# Patient Record
Sex: MALE | Race: WHITE | NOT HISPANIC OR LATINO | Employment: OTHER | URBAN - METROPOLITAN AREA
[De-identification: names, ages, dates, MRNs, and addresses within clinical notes are randomized per-mention and may not be internally consistent; named-entity substitution may affect disease eponyms.]

---

## 2019-06-09 PROBLEM — C21.0 ANAL CANCER: Status: ACTIVE | Noted: 2019-06-09

## 2019-06-10 ENCOUNTER — TELEPHONE (OUTPATIENT)
Dept: HEMATOLOGY/ONCOLOGY | Facility: CLINIC | Age: 59
End: 2019-06-10

## 2019-06-10 NOTE — TELEPHONE ENCOUNTER
Called patient back told him he did not need any labs for this visit     ----- Message from Luz Rodriguez RN sent at 6/10/2019  1:43 PM CDT -----  Regarding: Labs  He called to ask whether he needs to get labs done today before his appointment tomorrow.  Please call him if he needs labs.

## 2019-08-20 NOTE — PROGRESS NOTES
[facility] Hematolgy/Oncology  History & Physical    Subjective:      Patient ID:   NAME: Judah العلي III : 1960     58 y.o. male    Referring Doc: Raji Alvarez  Other Physicians: Dian Trujillo; Akbar Landry; Audi Capps (PCP)        Chief Complaint: Anal SCCA       HPI:  58 y.o. male with diagnosis of SCCA of the anus who has been referred by Dr Raji Alvarez for continuation of care by medical oncology. He was initially diagnosed in 2013 and under went combined therapy with radiation and chemotherapy with mitomycin and 5FU which was completed in early . He was initially followed by Dr Dian Alvarez and then Dr Alvarez. He is here by himself. He reports that he is doing ok. He has not seen Dr Webber in awhile. He reports that his HIV has been stable. He denies any rectal bleeding. No CP, SOB, HA's or N/V. He is retired musician.               ROS:   GEN: normal without any fever, night sweats or weight loss  HEENT: normal with no HA's, sore throat, stiff neck, changes in vision  CV: normal with no CP, SOB, PND, PIPER or orthopnea  PULM: normal with no SOB, cough, hemoptysis, sputum or pleuritic pain  GI: normal with no abdominal pain, nausea, vomiting, constipation, diarrhea, melanotic stools, BRBPR, or hematemesis  : normal with no hematuria, dysuria  BREAST: normal with no mass, discharge, pain  SKIN: normal with no rash, erythema, bruising, or swelling       Past Medical/Surgical History:  Past Medical History:   Diagnosis Date    Anal cancer 2019    Anal cancer - Squamous CA - 2019    S/P chemotherapy 2019    S/P radiation therapy 2019     History reviewed. No pertinent surgical history.      Allergies:  Review of patient's allergies indicates:   Allergen Reactions    Sulfa dyne     Vicodin [hydrocodone-acetaminophen]        Social/Family History:  Social History     Socioeconomic History    Marital status: Single     Spouse name: Not  on file    Number of children: Not on file    Years of education: Not on file    Highest education level: Not on file   Occupational History    Not on file   Social Needs    Financial resource strain: Not on file    Food insecurity:     Worry: Not on file     Inability: Not on file    Transportation needs:     Medical: Not on file     Non-medical: Not on file   Tobacco Use    Smoking status: Current Every Day Smoker     Packs/day: 1.00     Types: Cigarettes    Smokeless tobacco: Never Used   Substance and Sexual Activity    Alcohol use: Not Currently    Drug use: Not Currently    Sexual activity: Not on file   Lifestyle    Physical activity:     Days per week: Not on file     Minutes per session: Not on file    Stress: Not on file   Relationships    Social connections:     Talks on phone: Not on file     Gets together: Not on file     Attends Faith service: Not on file     Active member of club or organization: Not on file     Attends meetings of clubs or organizations: Not on file     Relationship status: Not on file   Other Topics Concern    Not on file   Social History Narrative    Not on file     History reviewed. No pertinent family history.      Medications:    Current Outpatient Medications:     abacavir/dolutegravir/lamivudi (TRIUMEQ ORAL), Take by mouth., Disp: , Rfl:     clonazePAM (KLONOPIN) 0.5 MG tablet, Take 1 tablet (0.5 mg total) by mouth 3 (three) times daily as needed for Anxiety., Disp: 90 tablet, Rfl: 3    darunavir ethanolate (PREZISTA) 800 mg Tab, Take 800 mg by mouth., Disp: , Rfl:     pravastatin (PRAVACHOL) 40 MG tablet, Take 40 mg by mouth once daily., Disp: , Rfl:     ritonavir (NORVIR) 100 mg Cap, Take 100 mg by mouth 2 (two) times daily., Disp: , Rfl:       Pathology:  Cancer Staging  No matching staging information was found for the patient.      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 rate 20, height 5' 7" (1.702 m), weight 72.5 kg (159 lb 14.4 oz).    Physical Examination:   GEN: no apparent distress, comfortable; AAOx3  HEAD: atraumatic and normocephalic  EYES: no pallor, no icterus, PERRLA  ENT: OMM, no pharyngeal erythema, external ears WNL; no nasal discharge; no thrush  NECK: no masses, thyroid normal, trachea midline, no LAD/LN's, supple  CV: RRR with no murmur; normal pulse; normal S1 and S2; no pedal edema  CHEST: Normal respiratory effort; CTAB; normal breath sounds; no wheeze or crackles  ABDOM: nontender and nondistended; soft; normal bowel sounds; no rebound/guarding  MUSC/Skeletal: ROM normal; no crepitus; joints normal; no deformities or arthropathy  EXTREM: no clubbing, cyanosis, inflammation or swelling  SKIN: no rashes, lesions, ulcers, petechiae or subcutaneous nodules  : no de la rosa  NEURO: grossly intact; motor/sensory WNL; AAOx3; no tremors  PSYCH: normal mood, affect and behavior  LYMPH: normal cervical, supraclavicular, axillary and groin LN's      Labs:     None available    Radiology/Diagnostic Studies:          All lab results and imaging results have been reviewed and discussed with the patient    Assessment:   (1) 58 y.o. male with diagnosis of SCCA of the anus who has been referred by Dr Raji Alvarez for continuation of care by medical oncology.   - diagnosed by rectal biopsy on 9/25/2013  - stage II  - T2 N0  M0  - s/p concurrent chemotherapy and XRT with 5FU and mitomycin completed in early 2014  - followed by Dr Webber with Gen Surg and Dr Gerard Turjillo with rad/onc    (2) HIV positive - followed by Dr Rosalie Landry with ID    (3) Former smoker      VISIT DIAGNOSES:              Anal cancer - Squamous CA - Sept 2013    S/P radiation therapy    S/P chemotherapy            Plan: "     PLAN:  1. Schedule PET scan  2. Refer to Dr Webber for scope  3. Check up to date labs  4. F/u with PCP, ID, etc  RTC in  4 weeks   Fax note to Frantz Capps [place], [place], [place]        I have explained and the patient understands all of  the current recommendation(s). I have answered all of their questions to the best of my ability and to their complete satisfaction.             Thank you for allowing me to participate in this patient's care. Please call with any questions or concerns.    Electronically signed Luis Cruz MD

## 2019-08-21 ENCOUNTER — TELEPHONE (OUTPATIENT)
Dept: HEMATOLOGY/ONCOLOGY | Facility: CLINIC | Age: 59
End: 2019-08-21

## 2019-08-21 ENCOUNTER — LAB VISIT (OUTPATIENT)
Dept: LAB | Facility: HOSPITAL | Age: 59
End: 2019-08-21
Attending: INTERNAL MEDICINE
Payer: MEDICAID

## 2019-08-21 ENCOUNTER — OFFICE VISIT (OUTPATIENT)
Dept: HEMATOLOGY/ONCOLOGY | Facility: CLINIC | Age: 59
End: 2019-08-21
Payer: MEDICAID

## 2019-08-21 VITALS
HEIGHT: 67 IN | WEIGHT: 159.88 LBS | SYSTOLIC BLOOD PRESSURE: 128 MMHG | DIASTOLIC BLOOD PRESSURE: 86 MMHG | TEMPERATURE: 99 F | RESPIRATION RATE: 20 BRPM | HEART RATE: 87 BPM | BODY MASS INDEX: 25.09 KG/M2

## 2019-08-21 DIAGNOSIS — C21.0 ANAL CANCER: Primary | ICD-10-CM

## 2019-08-21 DIAGNOSIS — Z92.21 S/P CHEMOTHERAPY, TIME SINCE GREATER THAN 12 WEEKS: ICD-10-CM

## 2019-08-21 DIAGNOSIS — Z92.3 S/P RADIATION THERAPY: ICD-10-CM

## 2019-08-21 DIAGNOSIS — C21.0 ANAL CANCER: ICD-10-CM

## 2019-08-21 LAB
ALBUMIN SERPL BCP-MCNC: 4.1 G/DL (ref 3.5–5.2)
ALP SERPL-CCNC: 45 U/L (ref 55–135)
ALT SERPL W/O P-5'-P-CCNC: 27 U/L (ref 10–44)
ANION GAP SERPL CALC-SCNC: 6 MMOL/L (ref 8–16)
AST SERPL-CCNC: 26 U/L (ref 10–40)
BASOPHILS # BLD AUTO: 0.03 K/UL (ref 0–0.2)
BASOPHILS NFR BLD: 0.3 % (ref 0–1.9)
BILIRUB SERPL-MCNC: 0.4 MG/DL (ref 0.1–1)
BUN SERPL-MCNC: 12 MG/DL (ref 6–20)
CALCIUM SERPL-MCNC: 9.3 MG/DL (ref 8.7–10.5)
CEA SERPL-MCNC: 3.6 NG/ML (ref 0–5)
CHLORIDE SERPL-SCNC: 107 MMOL/L (ref 95–110)
CO2 SERPL-SCNC: 23 MMOL/L (ref 23–29)
CREAT SERPL-MCNC: 0.9 MG/DL (ref 0.5–1.4)
DIFFERENTIAL METHOD: ABNORMAL
EOSINOPHIL # BLD AUTO: 0.1 K/UL (ref 0–0.5)
EOSINOPHIL NFR BLD: 1.2 % (ref 0–8)
ERYTHROCYTE [DISTWIDTH] IN BLOOD BY AUTOMATED COUNT: 13.6 % (ref 11.5–14.5)
EST. GFR  (AFRICAN AMERICAN): >60 ML/MIN/1.73 M^2
EST. GFR  (NON AFRICAN AMERICAN): >60 ML/MIN/1.73 M^2
GLUCOSE SERPL-MCNC: 95 MG/DL (ref 70–110)
HCT VFR BLD AUTO: 42.7 % (ref 40–54)
HGB BLD-MCNC: 14.2 G/DL (ref 14–18)
IMM GRANULOCYTES # BLD AUTO: 0.02 K/UL (ref 0–0.04)
IMM GRANULOCYTES NFR BLD AUTO: 0.2 % (ref 0–0.5)
LYMPHOCYTES # BLD AUTO: 4.7 K/UL (ref 1–4.8)
LYMPHOCYTES NFR BLD: 49.7 % (ref 18–48)
MCH RBC QN AUTO: 31.8 PG (ref 27–31)
MCHC RBC AUTO-ENTMCNC: 33.3 G/DL (ref 32–36)
MCV RBC AUTO: 96 FL (ref 82–98)
MONOCYTES # BLD AUTO: 0.8 K/UL (ref 0.3–1)
MONOCYTES NFR BLD: 8.2 % (ref 4–15)
NEUTROPHILS # BLD AUTO: 3.8 K/UL (ref 1.8–7.7)
NEUTROPHILS NFR BLD: 40.4 % (ref 38–73)
NRBC BLD-RTO: 0 /100 WBC
PLATELET # BLD AUTO: 230 K/UL (ref 150–350)
PMV BLD AUTO: 9.1 FL (ref 9.2–12.9)
POTASSIUM SERPL-SCNC: 4.4 MMOL/L (ref 3.5–5.1)
PROT SERPL-MCNC: 7.6 G/DL (ref 6–8.4)
RBC # BLD AUTO: 4.46 M/UL (ref 4.6–6.2)
SODIUM SERPL-SCNC: 136 MMOL/L (ref 136–145)
WBC # BLD AUTO: 9.36 K/UL (ref 3.9–12.7)

## 2019-08-21 PROCEDURE — 99204 OFFICE O/P NEW MOD 45 MIN: CPT | Mod: S$GLB,,, | Performed by: INTERNAL MEDICINE

## 2019-08-21 PROCEDURE — 99204 PR OFFICE/OUTPT VISIT, NEW, LEVL IV, 45-59 MIN: ICD-10-PCS | Mod: S$GLB,,, | Performed by: INTERNAL MEDICINE

## 2019-08-21 PROCEDURE — 85025 COMPLETE CBC W/AUTO DIFF WBC: CPT

## 2019-08-21 PROCEDURE — 80053 COMPREHEN METABOLIC PANEL: CPT

## 2019-08-21 PROCEDURE — 82378 CARCINOEMBRYONIC ANTIGEN: CPT

## 2019-08-21 RX ORDER — PRAVASTATIN SODIUM 40 MG/1
40 TABLET ORAL DAILY
COMMUNITY

## 2019-08-21 RX ORDER — DARUNAVIR 800 MG/1
800 TABLET, FILM COATED ORAL
COMMUNITY

## 2019-08-21 NOTE — TELEPHONE ENCOUNTER
Called patient let him know prescription is ready for      ----- Message from Gayle Phillips sent at 8/21/2019 11:48 AM CDT -----  Pt needs a refill of klonopin.    # [phone]

## 2019-08-22 ENCOUNTER — OFFICE VISIT (OUTPATIENT)
Dept: SURGERY | Facility: CLINIC | Age: 59
End: 2019-08-22
Payer: MEDICAID

## 2019-08-22 VITALS
HEIGHT: 67 IN | DIASTOLIC BLOOD PRESSURE: 80 MMHG | BODY MASS INDEX: 24.96 KG/M2 | SYSTOLIC BLOOD PRESSURE: 126 MMHG | WEIGHT: 159 LBS

## 2019-08-22 DIAGNOSIS — Z08 ENCOUNTER FOR FOLLOW-UP SURVEILLANCE OF ANAL CANCER: Primary | ICD-10-CM

## 2019-08-22 DIAGNOSIS — Z85.048 ENCOUNTER FOR FOLLOW-UP SURVEILLANCE OF ANAL CANCER: Primary | ICD-10-CM

## 2019-08-22 PROCEDURE — 99214 OFFICE O/P EST MOD 30 MIN: CPT | Mod: PBBFAC | Performed by: SURGERY

## 2019-08-22 PROCEDURE — 99999 PR PBB SHADOW E&M-EST. PATIENT-LVL IV: ICD-10-PCS | Mod: PBBFAC,,, | Performed by: SURGERY

## 2019-08-22 PROCEDURE — 99202 OFFICE O/P NEW SF 15 MIN: CPT | Mod: S$PBB,,, | Performed by: SURGERY

## 2019-08-22 PROCEDURE — 99999 PR PBB SHADOW E&M-EST. PATIENT-LVL IV: CPT | Mod: PBBFAC,,, | Performed by: SURGERY

## 2019-08-22 PROCEDURE — 99202 PR OFFICE/OUTPT VISIT, NEW, LEVL II, 15-29 MIN: ICD-10-PCS | Mod: S$PBB,,, | Performed by: SURGERY

## 2019-08-22 RX ORDER — ABACAVIR SULFATE, DOLUTEGRAVIR SODIUM, LAMIVUDINE 600; 50; 300 MG/1; MG/1; MG/1
1 TABLET, FILM COATED ORAL DAILY
Refills: 5 | COMMUNITY
Start: 2019-08-10

## 2019-08-22 RX ORDER — RITONAVIR 100 MG/1
TABLET ORAL
Refills: 0 | COMMUNITY
Start: 2019-08-11

## 2019-08-22 RX ORDER — ZOLPIDEM TARTRATE 10 MG/1
TABLET ORAL
Refills: 2 | COMMUNITY
Start: 2019-08-05

## 2019-08-22 NOTE — H&P (VIEW-ONLY)
Subjective:       Patient ID: Judah العلي III is a 58 y.o. male.    Chief Complaint: Other (Referred by  to [facility] for anoscopy)      HPI:  Patient is 58-year-old male with history of squamous cell anal cancer diagnosed September 2013.  He completed nigra protocol with remission of the anal cancer.  He has been feeling well.  He reports no rectal pain or bleeding. Last colonoscopy was about 9 years ago.  His last anoscopy was several years ago.  He has other past medical history of HIV and is continued to be compliant with his HIV medication.    Past Medical History:   Diagnosis Date    Anal cancer 6/9/2019    Anal cancer - Squamous CA - Sept 2013 6/9/2019    S/P chemotherapy 8/21/2019    S/P radiation therapy 8/21/2019     History reviewed. No pertinent surgical history.  Review of patient's allergies indicates:   Allergen Reactions    Sulfa dyne     Vicodin [hydrocodone-acetaminophen]      Medication List with Changes/Refills   Current Medications    CLONAZEPAM (KLONOPIN) 0.5 MG TABLET    Take 1 tablet (0.5 mg total) by mouth 3 (three) times daily as needed for Anxiety.    DARUNAVIR ETHANOLATE (PREZISTA) 800 MG TAB    Take 800 mg by mouth.    PRAVASTATIN (PRAVACHOL) 40 MG TABLET    Take 40 mg by mouth once daily.    RITONAVIR (NORVIR) 100 MG TAB TABLET    TK ONE T PO QD.    TRIUMEQ 600- MG TAB    Take 1 tablet by mouth once daily.    ZOLPIDEM (AMBIEN) 10 MG TAB    TK ONE T PO QHS PRF SLEEP   Discontinued Medications    ABACAVIR/DOLUTEGRAVIR/LAMIVUDI (TRIUMEQ ORAL)    Take by mouth.    RITONAVIR (NORVIR) 100 MG CAP    Take 100 mg by mouth 2 (two) times daily.     History reviewed. No pertinent family history.  Social History     Socioeconomic History    Marital status: Single     Spouse name: Not on file    Number of children: Not on file    Years of education: Not on file    Highest education level: Not on file   Occupational History    Not on file   Social Needs    Financial  resource strain: Not on file    Food insecurity:     Worry: Not on file     Inability: Not on file    Transportation needs:     Medical: Not on file     Non-medical: Not on file   Tobacco Use    Smoking status: Current Every Day Smoker     Packs/day: 1.00     Types: Cigarettes    Smokeless tobacco: Never Used   Substance and Sexual Activity    Alcohol use: Not Currently    Drug use: Not Currently    Sexual activity: Not on file   Lifestyle    Physical activity:     Days per week: Not on file     Minutes per session: Not on file    Stress: Not on file   Relationships    Social connections:     Talks on phone: Not on file     Gets together: Not on file     Attends Spiritism service: Not on file     Active member of club or organization: Not on file     Attends meetings of clubs or organizations: Not on file     Relationship status: Not on file   Other Topics Concern    Not on file   Social History Narrative    Not on file         Review of Systems   Constitutional: Negative for appetite change, chills, fever and unexpected weight change.   HENT: Negative for hearing loss, rhinorrhea, sore throat and voice change.    Eyes: Negative for photophobia and visual disturbance.   Respiratory: Negative for cough, choking and shortness of breath.    Cardiovascular: Negative for chest pain, palpitations and leg swelling.   Gastrointestinal: Negative for abdominal pain, blood in stool, constipation, diarrhea, nausea and vomiting.   Endocrine: Negative for cold intolerance, heat intolerance, polydipsia and polyuria.   Musculoskeletal: Negative for arthralgias, back pain, joint swelling and neck stiffness.   Skin: Negative for color change, pallor and rash.   Neurological: Negative for dizziness, seizures, syncope and headaches.   Hematological: Negative for adenopathy. Does not bruise/bleed easily.   Psychiatric/Behavioral: Negative for agitation, behavioral problems and confusion.       Objective:      Physical Exam    Constitutional: He is oriented to person, place, and time. He appears well-developed and well-nourished.  Non-toxic appearance. No distress.   HENT:   Head: Normocephalic and atraumatic. Head is without abrasion and without laceration.   Right Ear: External ear normal.   Left Ear: External ear normal.   Nose: Nose normal.   Mouth/Throat: Oropharynx is clear and moist.   Eyes: Pupils are equal, round, and reactive to light. EOM are normal.   Neck: Trachea normal and phonation normal. Neck supple. No tracheal deviation and normal range of motion present.   Cardiovascular: Normal rate and regular rhythm.   Pulmonary/Chest: Effort normal. No accessory muscle usage. No tachypnea. No respiratory distress.   Abdominal: Soft. Normal appearance and bowel sounds are normal. He exhibits no distension and no mass. There is no tenderness. There is no rigidity, no rebound and no guarding.   Genitourinary:   Genitourinary Comments: No external evidence of disease    Lymphadenopathy:        Right: No inguinal adenopathy present.        Left: No inguinal adenopathy present.   Neurological: He is alert and oriented to person, place, and time. Coordination and gait normal.   Skin: Skin is warm and intact.   Psychiatric: He has a normal mood and affect. His speech is normal and behavior is normal.       Assessment/Plan:   Judah was seen today for other.    Diagnoses and all orders for this visit:    Encounter for follow-up surveillance of anal cancer  -     Vital signs; Standing  -     Insert peripheral IV; Standing  -     Diet NPO; Standing  -     Pulse Oximetry Q4H; Standing  -     Case Request Operating Room: COLONOSCOPY  -     Full code; Standing  -     Place in Outpatient; Standing  -     Insert peripheral IV  -     Full code    Other orders  -     lidocaine (PF) 10 mg/ml (1%) injection 10 mg  -     0.9%  NaCl infusion  -     IP VTE LOW RISK PATIENT; Standing      History of T2N0 anal cancer s/p Avni protocol. Last colonoscopy  near 10 years ago.  Last anoscope was a few years ago. He will be scheduled for colonoscopy Sept 6th.      Planned procedure: colonoscopy     NPO past midnight    I discussed the proposed procedures to the the patient including risks, benefits, indications, alternatives and special concerns.  The patient appears to understand and agrees to go ahead with the procedure.  I have made no promises, warranties or verbal agreements beyond what was discussed above.

## 2019-08-22 NOTE — PROGRESS NOTES
Subjective:       Patient ID: Judah العلي III is a 58 y.o. male.    Chief Complaint: Other (Referred by  to [facility] for anoscopy)      HPI:  ***    Past Medical History:   Diagnosis Date    Anal cancer 6/9/2019    Anal cancer - Squamous CA - Sept 2013 6/9/2019    Anxiety     Depression     HIV (human immunodeficiency virus infection)     S/P chemotherapy 8/21/2019    S/P radiation therapy 8/21/2019     Past Surgical History:   Procedure Laterality Date    HEMORRHOID SURGERY  09/25/2013        REMOVAL OF VASCULAR ACCESS PORT  2014    TONSILLECTOMY       Review of patient's allergies indicates:   Allergen Reactions    Sulfa dyne     Vicodin [hydrocodone-acetaminophen]      Medication List with Changes/Refills   Current Medications    CLONAZEPAM (KLONOPIN) 0.5 MG TABLET    Take 1 tablet (0.5 mg total) by mouth 3 (three) times daily as needed for Anxiety.    DARUNAVIR ETHANOLATE (PREZISTA) 800 MG TAB    Take 800 mg by mouth.    PRAVASTATIN (PRAVACHOL) 40 MG TABLET    Take 40 mg by mouth once daily.    RITONAVIR (NORVIR) 100 MG TAB TABLET    TK ONE T PO QD.    TRIUMEQ 600- MG TAB    Take 1 tablet by mouth once daily.    ZOLPIDEM (AMBIEN) 10 MG TAB    TK ONE T PO QHS PRF SLEEP   Discontinued Medications    ABACAVIR/DOLUTEGRAVIR/LAMIVUDI (TRIUMEQ ORAL)    Take by mouth.    RITONAVIR (NORVIR) 100 MG CAP    Take 100 mg by mouth 2 (two) times daily.     Family History   Problem Relation Age of Onset    Lung cancer Mother      Social History     Socioeconomic History    Marital status: Single     Spouse name: Not on file    Number of children: Not on file    Years of education: Not on file    Highest education level: Not on file   Occupational History    Not on file   Social Needs    Financial resource strain: Not on file    Food insecurity:     Worry: Not on file     Inability: Not on file    Transportation needs:     Medical: Not on file     Non-medical: Not on file  "  Tobacco Use    Smoking status: Current Every Day Smoker     Packs/day: 1.00     Types: Cigarettes    Smokeless tobacco: Never Used   Substance and Sexual Activity    Alcohol use: Not Currently    Drug use: Not Currently    Sexual activity: Not on file   Lifestyle    Physical activity:     Days per week: Not on file     Minutes per session: Not on file    Stress: Not on file   Relationships    Social connections:     Talks on phone: Not on file     Gets together: Not on file     Attends Hoahaoism service: Not on file     Active member of club or organization: Not on file     Attends meetings of clubs or organizations: Not on file     Relationship status: Not on file   Other Topics Concern    Not on file   Social History Narrative    Not on file         Review of Systems   Constitutional: Negative for appetite change, chills, fever and unexpected weight change.   HENT: Negative for hearing loss, rhinorrhea, sore throat and voice change.    Eyes: Negative for photophobia and visual disturbance.   Respiratory: Negative for cough, choking and shortness of breath.    Cardiovascular: Negative for chest pain, palpitations and leg swelling.   Gastrointestinal: Negative for abdominal pain, blood in stool, constipation, diarrhea, nausea and vomiting.   Endocrine: Negative for cold intolerance, heat intolerance and polyphagia.   Genitourinary: Negative for dysuria.   Musculoskeletal: Negative for arthralgias and back pain.   Skin: Negative for color change.   Neurological: Negative for dizziness, seizures, syncope and headaches.   Hematological: Negative for adenopathy. Does not bruise/bleed easily.       Objective:      Physical Exam    Assessment/Plan:   Encounter for follow-up surveillance of anal cancer        Planned procedure: ***    {Blank single:41617::"Ancef 2 gm IV on call to OR","Mefoxin 2 gm IV on call to OR","Levaquin 500 mg IV on call to OR","Levaquin 500 mg IV AND Flagyl 500 mg IV on call to " "OR","Vanco 1 gm IV on call to OR"}    NPO past midnight    Maxwell cloth scrub per protocol    SCDs Bilateral Lower Extremities    I discussed the proposed procedures the the patient including risks, benefits, indications, alternatives and special concerns.  The patient appears to understand and agrees to go ahead with surgery.  I have made no promises, warranties or verbal agreements beyond what was discussed above.    No follow-ups on file.  "

## 2019-08-22 NOTE — PROGRESS NOTES
Subjective:       Patient ID: Judah العلي III is a 58 y.o. male.    Chief Complaint: Other (Referred by  to [facility] for anoscopy)      HPI:  Patient is 58-year-old male with history of squamous cell anal cancer diagnosed September 2013.  He completed nigra protocol with remission of the anal cancer.  He has been feeling well.  He reports no rectal pain or bleeding. Last colonoscopy was about 9 years ago.  His last anoscopy was several years ago.  He has other past medical history of HIV and is continued to be compliant with his HIV medication.    Past Medical History:   Diagnosis Date    Anal cancer 6/9/2019    Anal cancer - Squamous CA - Sept 2013 6/9/2019    S/P chemotherapy 8/21/2019    S/P radiation therapy 8/21/2019     History reviewed. No pertinent surgical history.  Review of patient's allergies indicates:   Allergen Reactions    Sulfa dyne     Vicodin [hydrocodone-acetaminophen]      Medication List with Changes/Refills   Current Medications    CLONAZEPAM (KLONOPIN) 0.5 MG TABLET    Take 1 tablet (0.5 mg total) by mouth 3 (three) times daily as needed for Anxiety.    DARUNAVIR ETHANOLATE (PREZISTA) 800 MG TAB    Take 800 mg by mouth.    PRAVASTATIN (PRAVACHOL) 40 MG TABLET    Take 40 mg by mouth once daily.    RITONAVIR (NORVIR) 100 MG TAB TABLET    TK ONE T PO QD.    TRIUMEQ 600- MG TAB    Take 1 tablet by mouth once daily.    ZOLPIDEM (AMBIEN) 10 MG TAB    TK ONE T PO QHS PRF SLEEP   Discontinued Medications    ABACAVIR/DOLUTEGRAVIR/LAMIVUDI (TRIUMEQ ORAL)    Take by mouth.    RITONAVIR (NORVIR) 100 MG CAP    Take 100 mg by mouth 2 (two) times daily.     History reviewed. No pertinent family history.  Social History     Socioeconomic History    Marital status: Single     Spouse name: Not on file    Number of children: Not on file    Years of education: Not on file    Highest education level: Not on file   Occupational History    Not on file   Social Needs    Financial  resource strain: Not on file    Food insecurity:     Worry: Not on file     Inability: Not on file    Transportation needs:     Medical: Not on file     Non-medical: Not on file   Tobacco Use    Smoking status: Current Every Day Smoker     Packs/day: 1.00     Types: Cigarettes    Smokeless tobacco: Never Used   Substance and Sexual Activity    Alcohol use: Not Currently    Drug use: Not Currently    Sexual activity: Not on file   Lifestyle    Physical activity:     Days per week: Not on file     Minutes per session: Not on file    Stress: Not on file   Relationships    Social connections:     Talks on phone: Not on file     Gets together: Not on file     Attends Jehovah's witness service: Not on file     Active member of club or organization: Not on file     Attends meetings of clubs or organizations: Not on file     Relationship status: Not on file   Other Topics Concern    Not on file   Social History Narrative    Not on file         Review of Systems   Constitutional: Negative for appetite change, chills, fever and unexpected weight change.   HENT: Negative for hearing loss, rhinorrhea, sore throat and voice change.    Eyes: Negative for photophobia and visual disturbance.   Respiratory: Negative for cough, choking and shortness of breath.    Cardiovascular: Negative for chest pain, palpitations and leg swelling.   Gastrointestinal: Negative for abdominal pain, blood in stool, constipation, diarrhea, nausea and vomiting.   Endocrine: Negative for cold intolerance, heat intolerance, polydipsia and polyuria.   Musculoskeletal: Negative for arthralgias, back pain, joint swelling and neck stiffness.   Skin: Negative for color change, pallor and rash.   Neurological: Negative for dizziness, seizures, syncope and headaches.   Hematological: Negative for adenopathy. Does not bruise/bleed easily.   Psychiatric/Behavioral: Negative for agitation, behavioral problems and confusion.       Objective:      Physical Exam    Constitutional: He is oriented to person, place, and time. He appears well-developed and well-nourished.  Non-toxic appearance. No distress.   HENT:   Head: Normocephalic and atraumatic. Head is without abrasion and without laceration.   Right Ear: External ear normal.   Left Ear: External ear normal.   Nose: Nose normal.   Mouth/Throat: Oropharynx is clear and moist.   Eyes: Pupils are equal, round, and reactive to light. EOM are normal.   Neck: Trachea normal and phonation normal. Neck supple. No tracheal deviation and normal range of motion present.   Cardiovascular: Normal rate and regular rhythm.   Pulmonary/Chest: Effort normal. No accessory muscle usage. No tachypnea. No respiratory distress.   Abdominal: Soft. Normal appearance and bowel sounds are normal. He exhibits no distension and no mass. There is no tenderness. There is no rigidity, no rebound and no guarding.   Genitourinary:   Genitourinary Comments: No external evidence of disease    Lymphadenopathy:        Right: No inguinal adenopathy present.        Left: No inguinal adenopathy present.   Neurological: He is alert and oriented to person, place, and time. Coordination and gait normal.   Skin: Skin is warm and intact.   Psychiatric: He has a normal mood and affect. His speech is normal and behavior is normal.       Assessment/Plan:   Judah was seen today for other.    Diagnoses and all orders for this visit:    Encounter for follow-up surveillance of anal cancer  -     Vital signs; Standing  -     Insert peripheral IV; Standing  -     Diet NPO; Standing  -     Pulse Oximetry Q4H; Standing  -     Case Request Operating Room: COLONOSCOPY  -     Full code; Standing  -     Place in Outpatient; Standing  -     Insert peripheral IV  -     Full code    Other orders  -     lidocaine (PF) 10 mg/ml (1%) injection 10 mg  -     0.9%  NaCl infusion  -     IP VTE LOW RISK PATIENT; Standing      History of T2N0 anal cancer s/p Avni protocol. Last colonoscopy  near 10 years ago.  Last anoscope was a few years ago. He will be scheduled for colonoscopy Sept 6th.      Planned procedure: colonoscopy     NPO past midnight    I discussed the proposed procedures to the the patient including risks, benefits, indications, alternatives and special concerns.  The patient appears to understand and agrees to go ahead with the procedure.  I have made no promises, warranties or verbal agreements beyond what was discussed above.

## 2019-08-22 NOTE — LETTER
August 23, 2019      Luis Cruz MD  [address] [facility]  [address]  [place] [address]           [facility]  [address]  [place] [address]  Phone: [phone]  Fax: [phone]          Patient: Judah العلي III   MR Number: [id number]   YOB: 1960   Date of Visit: 8/22/2019       Dear Dr. Luis Cruz:    Thank you for referring Judah العلي III to me for evaluation. Attached you will find relevant portions of my assessment and plan of care.    If you have questions, please do not hesitate to call me. I look forward to following Judah العلي III along with you.    Sincerely,    Enzo Webber III, MD    Enclosure  CC:  No Recipients    If you would like to receive this communication electronically, please contact [email or web address] or [phone] to request more information on [employer] access.    For providers and/or their staff who would like to refer a patient to [facility], please contact us through our one-stop-shop provider referral line, [facility], at [phone].    If you feel you have received this communication in error or would no longer like to receive these types of communications, please e-mail [email or web address]

## 2019-08-23 RX ORDER — SODIUM CHLORIDE 9 MG/ML
INJECTION, SOLUTION INTRAVENOUS CONTINUOUS
Status: CANCELLED | OUTPATIENT
Start: 2019-08-23

## 2019-08-23 RX ORDER — LIDOCAINE HYDROCHLORIDE 10 MG/ML
1 INJECTION, SOLUTION EPIDURAL; INFILTRATION; INTRACAUDAL; PERINEURAL ONCE
Status: DISCONTINUED | OUTPATIENT
Start: 2019-08-23 | End: 2019-09-16 | Stop reason: HOSPADM

## 2019-08-30 ENCOUNTER — TELEPHONE (OUTPATIENT)
Dept: HEMATOLOGY/ONCOLOGY | Facility: CLINIC | Age: 59
End: 2019-08-30

## 2019-08-30 NOTE — TELEPHONE ENCOUNTER
----- Message from Tarun Calle sent at 8/30/2019  8:56 AM CDT -----  PER REFERRAL DENIED PER PT INS NURSE ADVISED AND WILL CONTACT PATIENT ABOUT NEXT STEPS TO PROCESS REFERRAL       Sent to Lesley

## 2019-09-12 ENCOUNTER — TELEPHONE (OUTPATIENT)
Dept: HEMATOLOGY/ONCOLOGY | Facility: CLINIC | Age: 59
End: 2019-09-12

## 2019-09-12 DIAGNOSIS — D49.0 COLORECTAL NEOPLASM: ICD-10-CM

## 2019-09-16 ENCOUNTER — HOSPITAL ENCOUNTER (OUTPATIENT)
Facility: HOSPITAL | Age: 59
Discharge: HOME | End: 2019-09-16
Attending: SURGERY | Admitting: SURGERY
Payer: MEDICAID

## 2019-09-16 VITALS
OXYGEN SATURATION: 97 % | RESPIRATION RATE: 15 BRPM | DIASTOLIC BLOOD PRESSURE: 79 MMHG | TEMPERATURE: 98 F | HEART RATE: 90 BPM | WEIGHT: 159 LBS | SYSTOLIC BLOOD PRESSURE: 122 MMHG | HEIGHT: 66 IN | BODY MASS INDEX: 25.55 KG/M2

## 2019-09-16 DIAGNOSIS — Z85.048 ENCOUNTER FOR FOLLOW-UP SURVEILLANCE OF ANAL CANCER: Primary | ICD-10-CM

## 2019-09-16 DIAGNOSIS — Z12.11 COLON CANCER SCREENING: ICD-10-CM

## 2019-09-16 DIAGNOSIS — Z08 ENCOUNTER FOR FOLLOW-UP SURVEILLANCE OF ANAL CANCER: Primary | ICD-10-CM

## 2019-09-16 PROCEDURE — 45378 DIAGNOSTIC COLONOSCOPY: CPT | Performed by: SURGERY

## 2019-09-16 PROCEDURE — 99152 MOD SED SAME PHYS/QHP 5/>YRS: CPT | Mod: 59 | Performed by: SURGERY

## 2019-09-16 PROCEDURE — 45378 DIAGNOSTIC COLONOSCOPY: CPT | Mod: ,,, | Performed by: SURGERY

## 2019-09-16 PROCEDURE — 45378 PR COLONOSCOPY,DIAGNOSTIC: ICD-10-PCS | Mod: ,,, | Performed by: SURGERY

## 2019-09-16 PROCEDURE — 46600 DIAGNOSTIC ANOSCOPY SPX: CPT | Performed by: SURGERY

## 2019-09-16 PROCEDURE — 99153 MOD SED SAME PHYS/QHP EA: CPT | Performed by: SURGERY

## 2019-09-16 PROCEDURE — 63600175 PHARM REV CODE 636 W HCPCS: Performed by: SURGERY

## 2019-09-16 PROCEDURE — 27000681: Performed by: SURGERY

## 2019-09-16 RX ORDER — DIAZEPAM 10 MG/2ML
INJECTION INTRAMUSCULAR
Status: COMPLETED | OUTPATIENT
Start: 2019-09-16 | End: 2019-09-16

## 2019-09-16 RX ORDER — SODIUM CHLORIDE 9 MG/ML
INJECTION, SOLUTION INTRAVENOUS CONTINUOUS PRN
Status: COMPLETED | OUTPATIENT
Start: 2019-09-16 | End: 2019-09-16

## 2019-09-16 RX ORDER — SODIUM CHLORIDE 9 MG/ML
INJECTION, SOLUTION INTRAVENOUS CONTINUOUS
Status: DISCONTINUED | OUTPATIENT
Start: 2019-09-16 | End: 2019-09-16 | Stop reason: HOSPADM

## 2019-09-16 RX ORDER — SODIUM CHLORIDE 9 MG/ML
INJECTION, SOLUTION INTRAVENOUS CONTINUOUS
Status: CANCELLED | OUTPATIENT
Start: 2019-09-16

## 2019-09-16 RX ORDER — MEPERIDINE HYDROCHLORIDE 100 MG/ML
INJECTION INTRAMUSCULAR; INTRAVENOUS; SUBCUTANEOUS
Status: COMPLETED | OUTPATIENT
Start: 2019-09-16 | End: 2019-09-16

## 2019-09-16 RX ORDER — SODIUM CHLORIDE 0.9 % (FLUSH) 0.9 %
2 SYRINGE (ML) INJECTION
Status: DISCONTINUED | OUTPATIENT
Start: 2019-09-16 | End: 2019-09-16 | Stop reason: HOSPADM

## 2019-09-16 RX ORDER — MIDAZOLAM HYDROCHLORIDE 5 MG/ML
INJECTION INTRAMUSCULAR; INTRAVENOUS
Status: COMPLETED | OUTPATIENT
Start: 2019-09-16 | End: 2019-09-16

## 2019-09-16 RX ADMIN — MIDAZOLAM HYDROCHLORIDE 1 MG: 5 INJECTION, SOLUTION INTRAMUSCULAR; INTRAVENOUS at 11:09

## 2019-09-16 RX ADMIN — DIAZEPAM 5 MG: 5 INJECTION, SOLUTION INTRAMUSCULAR; INTRAVENOUS at 10:09

## 2019-09-16 RX ADMIN — Medication 25 MG: at 11:09

## 2019-09-16 RX ADMIN — Medication 50 MG: at 11:09

## 2019-09-16 RX ADMIN — MIDAZOLAM HYDROCHLORIDE 1 MG: 5 INJECTION, SOLUTION INTRAMUSCULAR; INTRAVENOUS at 10:09

## 2019-09-16 RX ADMIN — SODIUM CHLORIDE 100 ML/HR: 0.9 INJECTION, SOLUTION INTRAVENOUS at 10:09

## 2019-09-16 RX ADMIN — Medication 50 MG: at 10:09

## 2019-09-16 RX ADMIN — MIDAZOLAM HYDROCHLORIDE 2 MG: 5 INJECTION, SOLUTION INTRAMUSCULAR; INTRAVENOUS at 10:09

## 2019-09-16 RX ADMIN — Medication 25 MG: at 10:09

## 2019-09-16 NOTE — DISCHARGE INSTRUCTIONS
Colonoscopy     A camera attached to a flexible tube with a viewing lens is used to take video pictures.     Colonoscopy is a test to view the inside of your lower digestive tract (colon and rectum). Sometimes it can show the last part of the small intestine (ileum). During the test, small pieces of tissue may be removed for testing. This is called a biopsy. Small growths, such as polyps, may also be removed.   Why is colonoscopy done?  The test is done to help look for colon cancer. And it can help find the source of abdominal pain, bleeding, and changes in bowel habits. It may be needed once a year, depending on factors such as your:  · Age  · Health history  · Family health history  · Symptoms  · Results from any prior colonoscopy  Risks and possible complications  These include:  · Bleeding               · A puncture or tear in the colon   · Risks of anesthesia  · A cancer lesion not being seen  Getting ready   To prepare for the test:  · Talk with your healthcare provider about the risks of the test (see below). Also ask your healthcare provider about alternatives to the test.  · Tell your healthcare provider about any medicines you take. Also tell him or her about any health conditions you may have.  · Make sure your rectum and colon are empty for the test. Follow the diet and bowel prep instructions exactly. If you dont, the test may need to be rescheduled.  · Plan for a friend or family member to drive you home after the test.     Colonoscopy provides an inside view of the entire colon.     You may discuss the results with your doctor right away or at a future visit.  During the test   The test is usually done in the hospital on an outpatient basis. This means you go home the same day. The procedure takes about 30 minutes. During that time:  · You are given relaxing (sedating) medicine through an IV line. You may be drowsy, or fully asleep.  · The healthcare provider will first give you a physical exam to  check for anal and rectal problems.  · Then the anus is lubricated and the scope inserted.  · If you are awake, you may have a feeling similar to needing to have a bowel movement. You may also feel pressure as air is pumped into the colon. Its OK to pass gas during the procedure.  · Biopsy, polyp removal, or other treatments may be done during the test.  After the test   You may have gas right after the test. It can help to try to pass it to help prevent later bloating. Your healthcare provider may discuss the results with you right away. Or you may need to schedule a follow-up visit to talk about the results. After the test, you can go back to your normal eating and other activities. You may be tired from the sedation and need to rest for a few hours.  Date Last Reviewed: 11/1/2016 © 2000-2017 [employer]. [address] [place] [address]. All rights reserved. This information is not intended as a substitute for professional medical care. Always follow your healthcare professional's instruc  For the next 24 Hours No driving, making important decisions, drinking alcohol  If you should have  2 table spoons of Bright red blood from rectum report to the nearest emergency room.  Avoid gas forming food for today

## 2019-09-16 NOTE — INTERVAL H&P NOTE
The patient has been examined and the H&P has been reviewed:    I concur with the findings and no changes have occurred since H&P was written.    Anesthesia/Surgery risks, benefits and alternative options discussed and understood by patient/family.          Active Hospital Problems    Diagnosis  POA    Encounter for follow-up surveillance of anal cancer [Z08, Z85.048]  Not Applicable    Colon cancer screening [Z12.11]  Not Applicable      Resolved Hospital Problems   No resolved problems to display.

## 2019-09-16 NOTE — BRIEF OP NOTE
Critical access hospital  Brief Operative Note    SUMMARY     Surgery Date: 9/16/2019     Surgeon(s) and Role:     * Enzo Webber III, MD - Primary    Assisting Surgeon: None    Pre-op Diagnosis:  Encounter for follow-up surveillance of anal cancer [Z08, Z85.048]  Colon cancer screening     Post-op Diagnosis:  Post-Op Diagnosis Codes: same     * Encounter for follow-up surveillance of anal cancer [Z08, Z85.048]    Procedure(s) (LRB):  COLONOSCOPY (N/A) and anoscopy    Anesthesia: RN IV Sedation - 175mg demerol, 5mg versed, 10mg valium     Description of Procedure:     Procedure time 3782-2915    Findings - No polyps. No evidence of recurrent anal disease       Patient was brought to the endoscopy suite and put on his left side. He was given sedation. Digital rectal exam was performed without any abnormalities appreciated. Anoscopy was performed and no anal lesion or ulceration was appreciated. The endoscope was then introduced into the anorectum and advanced all the way to the cecum. The scope was then slowly withdrawn while insufflating. No polyps or other lesions appreciated in any segment of the colon. The scope was retroflexed while in the rectum and no rectal or distal rectal lesions were appreciated. He did have small internal hemorrhoids. Scope was withdrawn completely. He tolerated the procedure well. The prep was good.     Estimated Blood Loss: 0mL    Complications none         Specimens:   Specimen (12h ago, onward)    None

## 2019-09-16 NOTE — DISCHARGE SUMMARY
Discharge Summary  General Surgery      Admit Date: 9/16/2019    Discharge Date :9/16/2019    Attending Physician: Enzo Webber III     Discharge Physician: Enzo Webber III    Discharged Condition: good    Discharge Diagnosis: Encounter for follow-up surveillance of anal cancer [Z08, Z85.048]    Treatments/Procedures: Procedure(s) (LRB):  COLONOSCOPY (N/A)    Hospital Course: Uneventful; Discharged home from [facility]    Significant Diagnostic Studies: none    Disposition: Home or Self Care    Diet: Regular    Follow up: 6 months    Activity: No heavy lifting till seen in office.    Patient Instructions:   Discharge Medication List as of 9/16/2019 11:40 AM      CONTINUE these medications which have NOT CHANGED    Details   clonazePAM (KLONOPIN) 0.5 MG tablet Take 1 tablet (0.5 mg total) by mouth 3 (three) times daily as needed for Anxiety., Starting Fri 3/15/2019, Until Sat 3/14/2020, Print      darunavir ethanolate (PREZISTA) 800 mg Tab Take 800 mg by mouth., Historical Med      pravastatin (PRAVACHOL) 40 MG tablet Take 40 mg by mouth once daily., Historical Med      ritonavir (NORVIR) 100 mg Tab tablet TK ONE T PO QD., Historical Med      TRIUMEQ 600- mg Tab Take 1 tablet by mouth once daily., Starting Sat 8/10/2019, Historical Med      zolpidem (AMBIEN) 10 mg Tab TK ONE T PO QHS PRF SLEEP, Historical Med             Discharge Procedure Orders   Diet Adult Regular     Notify your health care provider if you experience any of the following:  temperature >100.4     Notify your health care provider if you experience any of the following:  persistent nausea and vomiting or diarrhea     Notify your health care provider if you experience any of the following:  redness, tenderness, or signs of infection (pain, swelling, redness, odor or green/yellow discharge around incision site)     Notify your health care provider if you experience any of the following:  increased confusion or weakness     Remove  dressing in 48 hours

## 2019-09-17 NOTE — OP NOTE
Surgery Date: 9/16/2019     Surgeon(s) and Role:     * Enzo Webber III, MD - Primary    Assisting Surgeon: None    Pre-op Diagnosis:  Encounter for follow-up surveillance of anal cancer [Z08, Z85.048]  Colon cancer screening     Post-op Diagnosis:  Post-Op Diagnosis Codes: same     * Encounter for follow-up surveillance of anal cancer [Z08, Z85.048]    Procedure(s) (LRB):  COLONOSCOPY (N/A) and anoscopy    Anesthesia: RN IV Sedation - 175mg demerol, 5mg versed, 10mg valium     Description of Procedure:     Procedure time [id number]    Findings - No polyps. No evidence of recurrent anal disease       Patient was brought to the endoscopy suite and put on his left side. He was given sedation. Digital rectal exam was performed without any abnormalities appreciated. Anoscopy was performed and no anal lesion or ulceration was appreciated. The endoscope was then introduced into the anorectum and advanced all the way to the cecum. The scope was then slowly withdrawn while insufflating. No polyps or other lesions appreciated in any segment of the colon. The scope was retroflexed while in the rectum and no rectal or distal rectal lesions were appreciated. He did have small internal hemorrhoids. Scope was withdrawn completely. He tolerated the procedure well. The prep was good.     Estimated Blood Loss: 0mL    Complications none         Specimens:   Specimen (12h ago, onward)    None

## 2019-09-19 ENCOUNTER — HOSPITAL ENCOUNTER (OUTPATIENT)
Dept: RADIOLOGY | Facility: HOSPITAL | Age: 59
Discharge: HOME | End: 2019-09-19
Attending: INTERNAL MEDICINE
Payer: MEDICAID

## 2019-09-19 DIAGNOSIS — D49.0 COLORECTAL NEOPLASM: ICD-10-CM

## 2019-09-19 PROCEDURE — 25500020 PHARM REV CODE 255: Mod: PO | Performed by: INTERNAL MEDICINE

## 2019-09-19 PROCEDURE — 74177 CT ABD & PELVIS W/CONTRAST: CPT | Mod: TC,PO

## 2019-09-19 RX ADMIN — IOHEXOL 100 ML: 350 INJECTION, SOLUTION INTRAVENOUS at 02:09

## 2019-09-23 NOTE — PROGRESS NOTES
[facility] Hematology/Oncology  PROGRESS NOTE - 2nd Follow-up Visit      Subjective:       Patient ID:   NAME: Judah العلي III : 1960     58 y.o. male    Referring Doc: Raji Alvarez  Other Physicians: Dian Trujillo; Akbar Landry; uAdi Capps (PCP)    Chief Complaint:  Anal SCCA f/u    History of Present Illness:     Patient returns today for a 2nd regularly scheduled follow-up visit.  The patient is here today to go over the results of the recently ordered labs, tests and studies. He is here by himself. He is doing ok with no new issues. Breathing ok, bowels ok. He denies any CP, SOB, Ha's or N/V. He saw Dr Webber and had repeat scope on 2019. He had some f/u CT scans on 2019.             ROS:   GEN: normal without any fever, night sweats or weight loss  HEENT: normal with no HA's, sore throat, stiff neck, changes in vision  CV: normal with no CP, SOB, PND, PIPER or orthopnea  PULM: normal with no SOB, cough, hemoptysis, sputum or pleuritic pain  GI: normal with no abdominal pain, nausea, vomiting, constipation, diarrhea, melanotic stools, BRBPR, or hematemesis  : normal with no hematuria, dysuria  BREAST: normal with no mass, discharge, pain  SKIN: normal with no rash, erythema, bruising, or swelling    Allergies:  Review of patient's allergies indicates:   Allergen Reactions    Sulfa dyne     Vicodin [hydrocodone-acetaminophen]        Medications:    Current Outpatient Medications:     clonazePAM (KLONOPIN) 0.5 MG tablet, Take 1 tablet (0.5 mg total) by mouth 3 (three) times daily as needed for Anxiety., Disp: 90 tablet, Rfl: 3    darunavir ethanolate (PREZISTA) 800 mg Tab, Take 800 mg by mouth., Disp: , Rfl:     ergocalciferol (ERGOCALCIFEROL) 50,000 unit Cap, TK 1 C PO Q WK FOR 56 DAYS, Disp: , Rfl: 2    pravastatin (PRAVACHOL) 40 MG tablet, Take 40 mg by mouth once daily., Disp: , Rfl:     ritonavir (NORVIR) 100 mg Tab tablet, TK ONE T PO QD., Disp: , Rfl: 0     TRIUMEQ 600- mg Tab, Take 1 tablet by mouth once daily., Disp: , Rfl: 5    zolpidem (AMBIEN) 10 mg Tab, TK ONE T PO QHS PRF SLEEP, Disp: , Rfl: 2    PMHx/PSHx Updates:  See patient's last visit with me on 8/21/2019.  See H&P on         Pathology:  Cancer Staging  No matching staging information was found for the patient.      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 rate 18, weight 71.7 kg (158 lb).    Physical Examination:   GEN: no apparent distress, comfortable; AAOx3  HEAD: atraumatic and normocephalic  EYES: no pallor, no icterus, PERRLA  ENT: OMM, no pharyngeal erythema, external ears WNL; no nasal discharge; no thrush  NECK: no masses, thyroid normal, trachea midline, no LAD/LN's, supple  CV: RRR with no murmur; normal pulse; normal S1 and S2; no pedal edema  CHEST: Normal respiratory effort; CTAB; normal breath sounds; no wheeze or crackles  ABDOM: nontender and nondistended; soft; normal bowel sounds; no rebound/guarding  MUSC/Skeletal: ROM normal; no crepitus; joints normal; no deformities or arthropathy  EXTREM: no clubbing, cyanosis, inflammation or swelling  SKIN: no rashes, lesions, ulcers, petechiae or subcutaneous nodules  : no de la rosa  NEURO: grossly intact; motor/sensory WNL; AAOx3; no tremors  PSYCH: normal mood, affect and behavior  LYMPH: normal cervical, supraclavicular, axillary and groin LN's            Labs:   8/21/2019  Lab Results   Component Value Date    WBC 9.36 08/21/2019    HGB 14.2 08/21/2019    HCT 42.7 08/21/2019    MCV 96 08/21/2019     08/21/2019     CMP  Sodium   Date Value Ref Range Status   08/21/2019 136 136 - 145 mmol/L Final     Potassium   Date Value Ref Range Status   08/21/2019 4.4 3.5 - 5.1 mmol/L Final     Chloride   Date Value Ref Range  Status   08/21/2019 107 95 - 110 mmol/L Final     CO2   Date Value Ref Range Status   08/21/2019 23 23 - 29 mmol/L Final     Glucose   Date Value Ref Range Status   08/21/2019 95 70 - 110 mg/dL Final     BUN, Bld   Date Value Ref Range Status   08/21/2019 12 6 - 20 mg/dL Final     Creatinine   Date Value Ref Range Status   08/21/2019 0.9 0.5 - 1.4 mg/dL Final     Calcium   Date Value Ref Range Status   08/21/2019 9.3 8.7 - 10.5 mg/dL Final     Total Protein   Date Value Ref Range Status   08/21/2019 7.6 6.0 - 8.4 g/dL Final     Albumin   Date Value Ref Range Status   08/21/2019 4.1 3.5 - 5.2 g/dL Final     Total Bilirubin   Date Value Ref Range Status   08/21/2019 0.4 0.1 - 1.0 mg/dL Final     Comment:     For infants and newborns, interpretation of results should be based  on gestational age, weight and in agreement with clinical  observations.  Premature Infant recommended reference ranges:  Up to 24 hours.............<8.0 mg/dL  Up to 48 hours............<12.0 mg/dL  3-5 days..................<15.0 mg/dL  6-29 days.................<15.0 mg/dL       Alkaline Phosphatase   Date Value Ref Range Status   08/21/2019 45 (L) 55 - 135 U/L Final     AST   Date Value Ref Range Status   08/21/2019 26 10 - 40 U/L Final     ALT   Date Value Ref Range Status   08/21/2019 27 10 - 44 U/L Final     Anion Gap   Date Value Ref Range Status   08/21/2019 6 (L) 8 - 16 mmol/L Final     eGFR if    Date Value Ref Range Status   08/21/2019 >60.0 >60 mL/min/1.73 m^2 Final     eGFR if non    Date Value Ref Range Status   08/21/2019 >60.0 >60 mL/min/1.73 m^2 Final     Comment:     Calculation used to obtain the estimated glomerular filtration  rate (eGFR) is the CKD-EPI equation.        Lab Results   Component Value Date    CEA 3.6 08/21/2019             Radiology/Diagnostic Studies:    Ct Chest Abdomen Pelvis With Contrast    Result Date: 9/19/2019      CMS MANDATED QUALITY DATA - CT RADIATION - 436 All CT  scans at this facility utilize dose modulation, iterative reconstruction, and/or weight based dosing when appropriate to reduce radiation dose to as low as reasonably achievable. CLINICAL HISTORY: ([id number])57 y/o  (1960) M Neoplasm: colorectal, rx monitor or f/u; Neoplasm of unspecified behavior of digestive system TECHNIQUE: (A#[id number], exam time 9/19/2019 15:10) CT CHEST ABDOMEN PELVIS WITH CONTRAST (XPD) [id number] Axial CT images of the chest, abdomen and pelvis were obtained from the thoracic inlet to the proximal thigh. COMPARISON: CT most recently from 12/11/2014. FINDINGS: CT Chest: Visualized neck: normal Lungs: Mild apical paraseptal emphysematous lung disease. Airway: The trachea and central bronchial tree appear normal. Pleura: There is no pleural effusion. There is no pneumothorax. Cardiovascular: The heart is normal in size. There is no pericardial effusion. The thoracic aorta and great vessels are normal in course and caliber. Mediastinum: There is no supraclavicular  axillary  mediastinal  or hilar lymphadenopathy. Soft tissues: There is moderate prominence of the breast tissue  possibly representing gynecomastia. Musculoskeletal: There are mild degenerative changes of the thoracic spine.  There are no suspicious osseous lesions. Esophagus: normal CT Abdomen: Liver: Relative diffuse low-attenuation liver consistent with hepatic steatosis; no intrahepatic mass is identified. Gallbladder: There is a small pharyngeal cap.  There are stones seen in the gallbladder without wall thickening or pericholecystic fluid to suggest acute cholecystitis. Biliary Tree: No intra or extrahepatic ductal dilation. Spleen: Normal. Pancreas: Within normal limits. Adrenal Glands: Normal Kidneys: Within normal limits. Vasculature: Scattered atheromatous calcifications are seen in the abdominal aorta and iliacs with mild ectasia of the infrarenal abdominal aorta measuring up to 2.5 cm in diameter. Lymph nodes: No  abdominal lymphadenopathy is seen. Intraperitoneal structures: There is no ascites. Bowel: Moderate volume of stool and gas in a partially distended colon.  No gross intrahepatic masses identified noting several segments of under distended colon, consider correlation with the patient's history of recent colonoscopy.  There is no evidence of obstruction, intra-abdominal free air or abscess.  The appendix is within normal limits (image 246). Abdominal wall: Small bilateral fat containing inguinal hernias. Musculoskeletal: Mild degenerative changes are seen in the lower lumbar spine. CT Pelvis: Bladder: Normal. Reproductive Organs: The prostate and seminal vesicles are within normal limits. Pelvic Lymph nodes: No pelvic lymphadenopathy or pelvic mass is identified.     No acute abdominal or pelvic process with numerous additional incidental findings as noted above.       Electronically signed by: Audi Quintana MD Date:    09/19/2019 Time:    15:18      I have reviewed all available lab results and radiology reports.    Assessment/Plan:   (1) 58 y.o. male with diagnosis of SCCA of the anus who has been referred by Dr Raji Alvarez for continuation of care by medical oncology.   - diagnosed by rectal biopsy on 9/25/2013  - stage II  - T2 N0  M0  - s/p concurrent chemotherapy and XRT with 5FU and mitomycin completed in early 2014  - followed by Dr Webber with Gen Surg and Dr Gerard Trujillo with rad/onc   - s/p repeat scope with Dr Webber on 9/17/2019  - he had recent labs which are all good  - recent Ct 9/19/2019 which show no acute process    (2) HIV positive - followed by Dr Rosalie Landry with ID     (3) Former smoker        VISIT DIAGNOSES:      Anal cancer - Squamous CA - Sept 2013    S/P radiation therapy    S/P chemotherapy          PLAN:  1. F/u with PCP and Gen Surgery as directed by them  2. F/u with Dr Landry with ID  3. RTc in 12 months    Fax note to Audi Capps Jr.,*, Antonio Landry [place],  Ronnie    Discussion:       I spent over 25 mins of time with the patient. Reviewed results of the recently ordered labs, tests and studies; made directives with regards to the results. Over half of this time was spent couseling and coordinating care.    I have explained all of the above in detail and the patient understands all of the current recommendation(s). I have answered all of their questions to the best of my ability and to their complete satisfaction.   The patient is to continue with the current management plan.            Electronically signed by Luis Cruz MD

## 2019-09-24 ENCOUNTER — OFFICE VISIT (OUTPATIENT)
Dept: HEMATOLOGY/ONCOLOGY | Facility: CLINIC | Age: 59
End: 2019-09-24
Payer: MEDICAID

## 2019-09-24 VITALS
WEIGHT: 158 LBS | TEMPERATURE: 97 F | BODY MASS INDEX: 25.5 KG/M2 | HEART RATE: 80 BPM | RESPIRATION RATE: 18 BRPM | DIASTOLIC BLOOD PRESSURE: 77 MMHG | SYSTOLIC BLOOD PRESSURE: 106 MMHG

## 2019-09-24 DIAGNOSIS — Z92.21 S/P CHEMOTHERAPY, TIME SINCE GREATER THAN 12 WEEKS: ICD-10-CM

## 2019-09-24 DIAGNOSIS — Z92.3 S/P RADIATION THERAPY: ICD-10-CM

## 2019-09-24 DIAGNOSIS — C21.0 ANAL CANCER: Primary | ICD-10-CM

## 2019-09-24 PROCEDURE — 99215 PR OFFICE/OUTPT VISIT, EST, LEVL V, 40-54 MIN: ICD-10-PCS | Mod: S$GLB,,, | Performed by: INTERNAL MEDICINE

## 2019-09-24 PROCEDURE — 99215 OFFICE O/P EST HI 40 MIN: CPT | Mod: S$GLB,,, | Performed by: INTERNAL MEDICINE

## 2019-09-24 RX ORDER — ERGOCALCIFEROL 1.25 MG/1
CAPSULE ORAL
Refills: 2 | COMMUNITY
Start: 2019-08-29

## 2019-09-24 RX ORDER — CLONAZEPAM 0.5 MG/1
0.5 TABLET ORAL 3 TIMES DAILY PRN
Qty: 90 TABLET | Refills: 3 | Status: SHIPPED | OUTPATIENT
Start: 2019-09-24 | End: 2020-08-06

## 2019-09-24 NOTE — TELEPHONE ENCOUNTER
----- Message from Gayle Phillips sent at 9/24/2019  2:39 PM CDT -----  Pt needs a refill on his [employer]     CB# [phone]

## 2019-09-25 NOTE — TELEPHONE ENCOUNTER
----- Message from Gayle Phillips sent at 9/24/2019  2:39 PM CDT -----  Pt needs a refill on his [employer]     CB# [phone]

## 2019-12-16 PROBLEM — Z08 ENCOUNTER FOR FOLLOW-UP SURVEILLANCE OF ANAL CANCER: Status: RESOLVED | Noted: 2019-09-16 | Resolved: 2019-12-16

## 2019-12-16 PROBLEM — Z85.048 ENCOUNTER FOR FOLLOW-UP SURVEILLANCE OF ANAL CANCER: Status: RESOLVED | Noted: 2019-09-16 | Resolved: 2019-12-16

## 2020-02-27 DIAGNOSIS — C21.0 ANAL CANCER: ICD-10-CM

## 2020-02-27 RX ORDER — CLONAZEPAM 0.5 MG/1
TABLET ORAL
Qty: 90 TABLET | Refills: 0 | OUTPATIENT
Start: 2020-02-27

## 2020-03-01 DIAGNOSIS — C21.0 ANAL CANCER: ICD-10-CM

## 2020-03-13 ENCOUNTER — TELEPHONE (OUTPATIENT)
Dept: HEMATOLOGY/ONCOLOGY | Facility: CLINIC | Age: 60
End: 2020-03-13

## 2020-03-13 DIAGNOSIS — C21.0 ANAL CANCER: ICD-10-CM

## 2020-03-13 NOTE — TELEPHONE ENCOUNTER
Call the patient and left a message that I called in his Klonopin at [facility] on [facility]. [phone].  Instructed him to call me if he needs to.

## 2020-03-13 NOTE — TELEPHONE ENCOUNTER
----- Message from Gayle Phillips sent at 3/12/2020  1:55 PM CDT -----  Pt needs a refill a refill of his klonzapam 0.5 mg. Total of 90. [facility] pharmacy on front.    CB# [phone]

## 2020-04-22 RX ORDER — CLONAZEPAM 0.5 MG/1
TABLET ORAL
Qty: 30 TABLET | Refills: 0 | OUTPATIENT
Start: 2020-04-22

## 2020-04-22 RX ORDER — CLONAZEPAM 0.5 MG/1
TABLET ORAL
Qty: 90 TABLET | Refills: 0 | OUTPATIENT
Start: 2020-04-22

## 2020-08-05 DIAGNOSIS — C21.0 ANAL CANCER: ICD-10-CM

## 2020-08-06 ENCOUNTER — TELEPHONE (OUTPATIENT)
Dept: HEMATOLOGY/ONCOLOGY | Facility: CLINIC | Age: 60
End: 2020-08-06

## 2020-08-06 RX ORDER — CLONAZEPAM 0.5 MG/1
TABLET ORAL
Qty: 90 TABLET | Refills: 0 | Status: SHIPPED | OUTPATIENT
Start: 2020-08-06 | End: 2021-06-14 | Stop reason: SDUPTHER

## 2020-09-23 NOTE — PROGRESS NOTES
[facility] Hematology/Oncology  PROGRESS NOTE -   Follow-up Visit      Subjective:       Patient ID:   NAME: Judah العلي III : 1960     59 y.o. male    Referring Doc: Raji Alvarez  Other Physicians: Dian Trujillo; Akbar Landry; Audi Capps (PCP)    Chief Complaint:  Anal SCCA f/u    History of Present Illness:     Patient returns today for a regularly scheduled follow-up visit.  The patient is here today to go over the results of the recently ordered labs, tests and studies. He is here by himself. He is doing ok with no new issues. Breathing ok, bowels ok. He denies any CP, SOB, Ha's or N/V.     He last saw Dr Webber and had repeat scope on 2019. He last had f/u CT scans on 2019.     Bowels are stable.    Discussed covid19 precautions            ROS:   GEN: normal without any fever, night sweats or weight loss  HEENT: normal with no HA's, sore throat, stiff neck, changes in vision  CV: normal with no CP, SOB, PND, PIPER or orthopnea  PULM: normal with no SOB, cough, hemoptysis, sputum or pleuritic pain  GI: normal with no abdominal pain, nausea, vomiting, constipation, diarrhea, melanotic stools, BRBPR, or hematemesis  : normal with no hematuria, dysuria  BREAST: normal with no mass, discharge, pain  SKIN: normal with no rash, erythema, bruising, or swelling    Allergies:  Review of patient's allergies indicates:   Allergen Reactions    Sulfa dyne     Vicodin [hydrocodone-acetaminophen]        Medications:    Current Outpatient Medications:     clonazePAM (KLONOPIN) 0.5 MG tablet, TAKE 1 TABLET BY MOUTH THREE TIMES DAILY AS NEEDED, Disp: 90 tablet, Rfl: 0    darunavir ethanolate (PREZISTA) 800 mg Tab, Take 800 mg by mouth., Disp: , Rfl:     ergocalciferol (ERGOCALCIFEROL) 50,000 unit Cap, TK 1 C PO Q WK FOR 56 DAYS, Disp: , Rfl: 2    pravastatin (PRAVACHOL) 40 MG tablet, Take 40 mg by mouth once daily., Disp: , Rfl:     ritonavir (NORVIR) 100 mg Tab tablet, TK ONE T PO  QD., Disp: , Rfl: 0    TRIUMEQ 600- mg Tab, Take 1 tablet by mouth once daily., Disp: , Rfl: 5    zolpidem (AMBIEN) 10 mg Tab, TK ONE T PO QHS PRF SLEEP, Disp: , Rfl: 2    PMHx/PSHx Updates:  See patient's last visit with me on 9/24/2019.  See H&P on         Pathology:  Cancer Staging  No matching staging information was found for the patient.      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 rate 19, weight 77.1 kg (170 lb).    Physical Examination:   GEN: no apparent distress, comfortable; AAOx3  HEAD: atraumatic and normocephalic  EYES: no pallor, no icterus, PERRLA  ENT: OMM, no pharyngeal erythema, external ears WNL; no nasal discharge; no thrush  NECK: no masses, thyroid normal, trachea midline, no LAD/LN's, supple  CV: RRR with no murmur; normal pulse; normal S1 and S2; no pedal edema  CHEST: Normal respiratory effort; CTAB; normal breath sounds; no wheeze or crackles  ABDOM: nontender and nondistended; soft; normal bowel sounds; no rebound/guarding  MUSC/Skeletal: ROM normal; no crepitus; joints normal; no deformities or arthropathy  EXTREM: no clubbing, cyanosis, inflammation or swelling  SKIN: no rashes, lesions, ulcers, petechiae or subcutaneous nodules  : no de la rosa  NEURO: grossly intact; motor/sensory WNL; AAOx3; no tremors  PSYCH: normal mood, affect and behavior  LYMPH: normal cervical, supraclavicular, axillary and groin LN's            Labs:      Lab Results   Component Value Date    WBC 9.36 08/21/2019    HGB 14.2 08/21/2019    HCT 42.7 08/21/2019    MCV 96 08/21/2019     08/21/2019     CMP  Sodium   Date Value Ref Range Status   08/21/2019 136 136 - 145 mmol/L Final     Potassium   Date Value Ref Range Status   08/21/2019 4.4 3.5 - 5.1 mmol/L Final     Chloride   Date  Value Ref Range Status   08/21/2019 107 95 - 110 mmol/L Final     CO2   Date Value Ref Range Status   08/21/2019 23 23 - 29 mmol/L Final     Glucose   Date Value Ref Range Status   08/21/2019 95 70 - 110 mg/dL Final     BUN, Bld   Date Value Ref Range Status   08/21/2019 12 6 - 20 mg/dL Final     Creatinine   Date Value Ref Range Status   08/21/2019 0.9 0.5 - 1.4 mg/dL Final     Calcium   Date Value Ref Range Status   08/21/2019 9.3 8.7 - 10.5 mg/dL Final     Total Protein   Date Value Ref Range Status   08/21/2019 7.6 6.0 - 8.4 g/dL Final     Albumin   Date Value Ref Range Status   08/21/2019 4.1 3.5 - 5.2 g/dL Final     Total Bilirubin   Date Value Ref Range Status   08/21/2019 0.4 0.1 - 1.0 mg/dL Final     Comment:     For infants and newborns, interpretation of results should be based  on gestational age, weight and in agreement with clinical  observations.  Premature Infant recommended reference ranges:  Up to 24 hours.............<8.0 mg/dL  Up to 48 hours............<12.0 mg/dL  3-5 days..................<15.0 mg/dL  6-29 days.................<15.0 mg/dL       Alkaline Phosphatase   Date Value Ref Range Status   08/21/2019 45 (L) 55 - 135 U/L Final     AST   Date Value Ref Range Status   08/21/2019 26 10 - 40 U/L Final     ALT   Date Value Ref Range Status   08/21/2019 27 10 - 44 U/L Final     Anion Gap   Date Value Ref Range Status   08/21/2019 6 (L) 8 - 16 mmol/L Final     eGFR if    Date Value Ref Range Status   08/21/2019 >60.0 >60 mL/min/1.73 m^2 Final     eGFR if non    Date Value Ref Range Status   08/21/2019 >60.0 >60 mL/min/1.73 m^2 Final     Comment:     Calculation used to obtain the estimated glomerular filtration  rate (eGFR) is the CKD-EPI equation.        Lab Results   Component Value Date    CEA 3.6 08/21/2019             Radiology/Diagnostic Studies:    Ct Chest Abdomen Pelvis With Contrast    Result Date: 9/19/2019      CMS MANDATED QUALITY DATA - CT RADIATION  - 436 All CT scans at this facility utilize dose modulation, iterative reconstruction, and/or weight based dosing when appropriate to reduce radiation dose to as low as reasonably achievable. CLINICAL HISTORY: ([id number])57 y/o  (1960) M Neoplasm: colorectal, rx monitor or f/u; Neoplasm of unspecified behavior of digestive system TECHNIQUE: (A#[id number], exam time 9/19/2019 15:10) CT CHEST ABDOMEN PELVIS WITH CONTRAST (XPD) [id number] Axial CT images of the chest, abdomen and pelvis were obtained from the thoracic inlet to the proximal thigh. COMPARISON: CT most recently from 12/11/2014. FINDINGS: CT Chest: Visualized neck: normal Lungs: Mild apical paraseptal emphysematous lung disease. Airway: The trachea and central bronchial tree appear normal. Pleura: There is no pleural effusion. There is no pneumothorax. Cardiovascular: The heart is normal in size. There is no pericardial effusion. The thoracic aorta and great vessels are normal in course and caliber. Mediastinum: There is no supraclavicular  axillary  mediastinal  or hilar lymphadenopathy. Soft tissues: There is moderate prominence of the breast tissue  possibly representing gynecomastia. Musculoskeletal: There are mild degenerative changes of the thoracic spine.  There are no suspicious osseous lesions. Esophagus: normal CT Abdomen: Liver: Relative diffuse low-attenuation liver consistent with hepatic steatosis; no intrahepatic mass is identified. Gallbladder: There is a small pharyngeal cap.  There are stones seen in the gallbladder without wall thickening or pericholecystic fluid to suggest acute cholecystitis. Biliary Tree: No intra or extrahepatic ductal dilation. Spleen: Normal. Pancreas: Within normal limits. Adrenal Glands: Normal Kidneys: Within normal limits. Vasculature: Scattered atheromatous calcifications are seen in the abdominal aorta and iliacs with mild ectasia of the infrarenal abdominal aorta measuring up to 2.5 cm in diameter. Lymph  nodes: No abdominal lymphadenopathy is seen. Intraperitoneal structures: There is no ascites. Bowel: Moderate volume of stool and gas in a partially distended colon.  No gross intrahepatic masses identified noting several segments of under distended colon, consider correlation with the patient's history of recent colonoscopy.  There is no evidence of obstruction, intra-abdominal free air or abscess.  The appendix is within normal limits (image 246). Abdominal wall: Small bilateral fat containing inguinal hernias. Musculoskeletal: Mild degenerative changes are seen in the lower lumbar spine. CT Pelvis: Bladder: Normal. Reproductive Organs: The prostate and seminal vesicles are within normal limits. Pelvic Lymph nodes: No pelvic lymphadenopathy or pelvic mass is identified.     No acute abdominal or pelvic process with numerous additional incidental findings as noted above.       Electronically signed by: Audi Quintana MD Date:    09/19/2019 Time:    15:18      I have reviewed all available lab results and radiology reports.    Assessment/Plan:   (1) 59 y.o. male with diagnosis of SCCA of the anus who has been referred by Dr Raji Alvarez for continuation of care by medical oncology.   - diagnosed by rectal biopsy on 9/25/2013  - stage II  - T2 N0  M0  - s/p concurrent chemotherapy and XRT with 5FU and mitomycin completed in early 2014  - followed by Dr Webber with Gen Surg and Dr Gerard Trujillo with rad/onc   - s/p repeat scope with Dr Webber on 9/17/2019  - he had recent labs which are all good  - recent Ct 9/19/2019 which show no acute process    (2) HIV positive - followed by Dr Rosalie Landry with ID     (3) Former smoker        VISIT DIAGNOSES:      Anal cancer - Squamous CA - Sept 2013    S/P chemotherapy    S/P radiation therapy          PLAN:  1. F/u with PCP and Gen Surgery as directed by them  2. F/u with Dr Landry with ID  3. Check up to date labs incl. CEA  4. F/u with Dr Webber for repeat scopes as  directed by him  5. RTc in 12 months    Fax note to Frantz Capps [facility]     Discussion:     COVID-19 Discussion:    I had long discussion with patient and any applicable family about the COVID-19 coronavirus epidemic and the recommended precautions with regard to cancer and/or hematology patients. I have re-iterated the CDC recommendations for adequate hand washing, use of hand -like products, and coughing into elbow, etc. In addition, especially for our patients who are on chemotherapy and/or our otherwise immunocompromised patients, I have recommended avoidance of crowds, including movie theaters, restaurants, churches, etc. I have recommended avoidance of any sick or symptomatic family members and/or friends. I have also recommended avoidance of any raw and unwashed food products, and general avoidance of food items that have not been prepared by themselves. The patient has been asked to call us immediately with any symptom developments, issues, questions or other general concerns.     I spent over 25 mins of time with the patient. Reviewed results of the recently ordered labs, tests and studies; made directives with regards to the results. Over half of this time was spent couseling and coordinating care.    I have explained all of the above in detail and the patient understands all of the current recommendation(s). I have answered all of their questions to the best of my ability and to their complete satisfaction.   The patient is to continue with the current management plan.            Electronically signed by Luis Cruz MD

## 2020-09-24 ENCOUNTER — OFFICE VISIT (OUTPATIENT)
Dept: HEMATOLOGY/ONCOLOGY | Facility: CLINIC | Age: 60
End: 2020-09-24
Payer: MEDICAID

## 2020-09-24 ENCOUNTER — LAB VISIT (OUTPATIENT)
Dept: LAB | Facility: HOSPITAL | Age: 60
End: 2020-09-24
Attending: INTERNAL MEDICINE
Payer: MEDICAID

## 2020-09-24 VITALS
BODY MASS INDEX: 27.44 KG/M2 | SYSTOLIC BLOOD PRESSURE: 143 MMHG | WEIGHT: 170 LBS | DIASTOLIC BLOOD PRESSURE: 88 MMHG | TEMPERATURE: 98 F | HEART RATE: 90 BPM | RESPIRATION RATE: 19 BRPM

## 2020-09-24 DIAGNOSIS — Z92.21 S/P CHEMOTHERAPY, TIME SINCE GREATER THAN 12 WEEKS: ICD-10-CM

## 2020-09-24 DIAGNOSIS — C21.0 ANAL CANCER: ICD-10-CM

## 2020-09-24 DIAGNOSIS — Z92.3 S/P RADIATION THERAPY: ICD-10-CM

## 2020-09-24 DIAGNOSIS — C21.0 ANAL CANCER: Primary | ICD-10-CM

## 2020-09-24 LAB
ALBUMIN SERPL BCP-MCNC: 4 G/DL (ref 3.5–5.2)
ALP SERPL-CCNC: 45 U/L (ref 55–135)
ALT SERPL W/O P-5'-P-CCNC: 29 U/L (ref 10–44)
ANION GAP SERPL CALC-SCNC: 11 MMOL/L (ref 8–16)
AST SERPL-CCNC: 23 U/L (ref 10–40)
BASOPHILS # BLD AUTO: 0.04 K/UL (ref 0–0.2)
BASOPHILS NFR BLD: 0.5 % (ref 0–1.9)
BILIRUB SERPL-MCNC: 0.4 MG/DL (ref 0.1–1)
BUN SERPL-MCNC: 14 MG/DL (ref 6–20)
CALCIUM SERPL-MCNC: 9.2 MG/DL (ref 8.7–10.5)
CEA SERPL-MCNC: 3.8 NG/ML (ref 0–5)
CHLORIDE SERPL-SCNC: 106 MMOL/L (ref 95–110)
CO2 SERPL-SCNC: 23 MMOL/L (ref 23–29)
CREAT SERPL-MCNC: 1 MG/DL (ref 0.5–1.4)
DIFFERENTIAL METHOD: ABNORMAL
EOSINOPHIL # BLD AUTO: 0.2 K/UL (ref 0–0.5)
EOSINOPHIL NFR BLD: 2 % (ref 0–8)
ERYTHROCYTE [DISTWIDTH] IN BLOOD BY AUTOMATED COUNT: 13.6 % (ref 11.5–14.5)
EST. GFR  (AFRICAN AMERICAN): >60 ML/MIN/1.73 M^2
EST. GFR  (NON AFRICAN AMERICAN): >60 ML/MIN/1.73 M^2
GLUCOSE SERPL-MCNC: 98 MG/DL (ref 70–110)
HCT VFR BLD AUTO: 40 % (ref 40–54)
HGB BLD-MCNC: 13.3 G/DL (ref 14–18)
IMM GRANULOCYTES # BLD AUTO: 0.03 K/UL (ref 0–0.04)
IMM GRANULOCYTES NFR BLD AUTO: 0.3 % (ref 0–0.5)
LYMPHOCYTES # BLD AUTO: 4.3 K/UL (ref 1–4.8)
LYMPHOCYTES NFR BLD: 50.1 % (ref 18–48)
MCH RBC QN AUTO: 31.7 PG (ref 27–31)
MCHC RBC AUTO-ENTMCNC: 33.3 G/DL (ref 32–36)
MCV RBC AUTO: 96 FL (ref 82–98)
MONOCYTES # BLD AUTO: 0.7 K/UL (ref 0.3–1)
MONOCYTES NFR BLD: 8.5 % (ref 4–15)
NEUTROPHILS # BLD AUTO: 3.3 K/UL (ref 1.8–7.7)
NEUTROPHILS NFR BLD: 38.6 % (ref 38–73)
NRBC BLD-RTO: 0 /100 WBC
PLATELET # BLD AUTO: 251 K/UL (ref 150–350)
PMV BLD AUTO: 8.8 FL (ref 9.2–12.9)
POTASSIUM SERPL-SCNC: 4.3 MMOL/L (ref 3.5–5.1)
PROT SERPL-MCNC: 7.1 G/DL (ref 6–8.4)
RBC # BLD AUTO: 4.19 M/UL (ref 4.6–6.2)
SODIUM SERPL-SCNC: 140 MMOL/L (ref 136–145)
WBC # BLD AUTO: 8.66 K/UL (ref 3.9–12.7)

## 2020-09-24 PROCEDURE — 99214 PR OFFICE/OUTPT VISIT, EST, LEVL IV, 30-39 MIN: ICD-10-PCS | Mod: S$GLB,,, | Performed by: INTERNAL MEDICINE

## 2020-09-24 PROCEDURE — 80053 COMPREHEN METABOLIC PANEL: CPT

## 2020-09-24 PROCEDURE — 36415 COLL VENOUS BLD VENIPUNCTURE: CPT

## 2020-09-24 PROCEDURE — 85025 COMPLETE CBC W/AUTO DIFF WBC: CPT

## 2020-09-24 PROCEDURE — 99214 OFFICE O/P EST MOD 30 MIN: CPT | Mod: S$GLB,,, | Performed by: INTERNAL MEDICINE

## 2020-09-24 PROCEDURE — 82378 CARCINOEMBRYONIC ANTIGEN: CPT

## 2021-01-14 ENCOUNTER — LAB VISIT (OUTPATIENT)
Dept: PRIMARY CARE CLINIC | Facility: OTHER | Age: 61
End: 2021-01-14
Attending: INTERNAL MEDICINE
Payer: MEDICAID

## 2021-01-14 DIAGNOSIS — Z20.822 ENCOUNTER FOR LABORATORY TESTING FOR COVID-19 VIRUS: ICD-10-CM

## 2021-01-14 PROCEDURE — U0003 INFECTIOUS AGENT DETECTION BY NUCLEIC ACID (DNA OR RNA); SEVERE ACUTE RESPIRATORY SYNDROME CORONAVIRUS 2 (SARS-COV-2) (CORONAVIRUS DISEASE [COVID-19]), AMPLIFIED PROBE TECHNIQUE, MAKING USE OF HIGH THROUGHPUT TECHNOLOGIES AS DESCRIBED BY CMS-2020-01-R: HCPCS

## 2021-01-15 LAB — SARS-COV-2 RNA RESP QL NAA+PROBE: NOT DETECTED

## 2021-04-29 ENCOUNTER — PATIENT MESSAGE (OUTPATIENT)
Dept: RESEARCH | Facility: HOSPITAL | Age: 61
End: 2021-04-29

## 2021-06-14 ENCOUNTER — TELEPHONE (OUTPATIENT)
Dept: HEMATOLOGY/ONCOLOGY | Facility: CLINIC | Age: 61
End: 2021-06-14

## 2021-06-14 DIAGNOSIS — C21.0 ANAL CANCER: ICD-10-CM

## 2021-06-14 RX ORDER — CLONAZEPAM 0.5 MG/1
0.5 TABLET ORAL DAILY PRN
Qty: 30 TABLET | Refills: 0 | Status: SHIPPED | OUTPATIENT
Start: 2021-06-14

## 2021-06-14 RX ORDER — CLONAZEPAM 0.5 MG/1
TABLET ORAL
Qty: 90 TABLET | Refills: 0 | OUTPATIENT
Start: 2021-06-14

## 2021-09-23 ENCOUNTER — LAB VISIT (OUTPATIENT)
Dept: LAB | Facility: HOSPITAL | Age: 61
End: 2021-09-23
Attending: INTERNAL MEDICINE
Payer: MEDICAID

## 2021-09-23 ENCOUNTER — OFFICE VISIT (OUTPATIENT)
Dept: HEMATOLOGY/ONCOLOGY | Facility: CLINIC | Age: 61
End: 2021-09-23
Payer: MEDICAID

## 2021-09-23 VITALS
BODY MASS INDEX: 28.33 KG/M2 | TEMPERATURE: 98 F | DIASTOLIC BLOOD PRESSURE: 84 MMHG | SYSTOLIC BLOOD PRESSURE: 130 MMHG | HEART RATE: 90 BPM | WEIGHT: 175.5 LBS

## 2021-09-23 DIAGNOSIS — Z92.3 S/P RADIATION THERAPY: ICD-10-CM

## 2021-09-23 DIAGNOSIS — Z92.21 S/P CHEMOTHERAPY, TIME SINCE GREATER THAN 12 WEEKS: ICD-10-CM

## 2021-09-23 DIAGNOSIS — C21.0 ANAL CANCER: Primary | ICD-10-CM

## 2021-09-23 DIAGNOSIS — C21.0 ANAL CANCER: ICD-10-CM

## 2021-09-23 LAB
ALBUMIN SERPL BCP-MCNC: 4.1 G/DL (ref 3.5–5.2)
ALP SERPL-CCNC: 42 U/L (ref 55–135)
ALT SERPL W/O P-5'-P-CCNC: 43 U/L (ref 10–44)
ANION GAP SERPL CALC-SCNC: 10 MMOL/L (ref 8–16)
AST SERPL-CCNC: 31 U/L (ref 10–40)
BASOPHILS # BLD AUTO: 0.04 K/UL (ref 0–0.2)
BASOPHILS NFR BLD: 0.5 % (ref 0–1.9)
BILIRUB SERPL-MCNC: 0.4 MG/DL (ref 0.1–1)
BUN SERPL-MCNC: 19 MG/DL (ref 6–20)
CALCIUM SERPL-MCNC: 9.1 MG/DL (ref 8.7–10.5)
CEA SERPL-MCNC: 3.8 NG/ML (ref 0–5)
CHLORIDE SERPL-SCNC: 109 MMOL/L (ref 95–110)
CO2 SERPL-SCNC: 22 MMOL/L (ref 23–29)
CREAT SERPL-MCNC: 1.1 MG/DL (ref 0.5–1.4)
DIFFERENTIAL METHOD: ABNORMAL
EOSINOPHIL # BLD AUTO: 0.2 K/UL (ref 0–0.5)
EOSINOPHIL NFR BLD: 2.1 % (ref 0–8)
ERYTHROCYTE [DISTWIDTH] IN BLOOD BY AUTOMATED COUNT: 13.3 % (ref 11.5–14.5)
EST. GFR  (AFRICAN AMERICAN): >60 ML/MIN/1.73 M^2
EST. GFR  (NON AFRICAN AMERICAN): >60 ML/MIN/1.73 M^2
GLUCOSE SERPL-MCNC: 109 MG/DL (ref 70–110)
HCT VFR BLD AUTO: 41.4 % (ref 40–54)
HGB BLD-MCNC: 13.7 G/DL (ref 14–18)
IMM GRANULOCYTES # BLD AUTO: 0.02 K/UL (ref 0–0.04)
IMM GRANULOCYTES NFR BLD AUTO: 0.2 % (ref 0–0.5)
LYMPHOCYTES # BLD AUTO: 4.1 K/UL (ref 1–4.8)
LYMPHOCYTES NFR BLD: 47.9 % (ref 18–48)
MCH RBC QN AUTO: 31.6 PG (ref 27–31)
MCHC RBC AUTO-ENTMCNC: 33.1 G/DL (ref 32–36)
MCV RBC AUTO: 96 FL (ref 82–98)
MONOCYTES # BLD AUTO: 0.7 K/UL (ref 0.3–1)
MONOCYTES NFR BLD: 8.1 % (ref 4–15)
NEUTROPHILS # BLD AUTO: 3.5 K/UL (ref 1.8–7.7)
NEUTROPHILS NFR BLD: 41.2 % (ref 38–73)
NRBC BLD-RTO: 0 /100 WBC
PLATELET # BLD AUTO: 287 K/UL (ref 150–450)
PMV BLD AUTO: 9.7 FL (ref 9.2–12.9)
POTASSIUM SERPL-SCNC: 4.2 MMOL/L (ref 3.5–5.1)
PROT SERPL-MCNC: 7.5 G/DL (ref 6–8.4)
RBC # BLD AUTO: 4.33 M/UL (ref 4.6–6.2)
SODIUM SERPL-SCNC: 141 MMOL/L (ref 136–145)
WBC # BLD AUTO: 8.5 K/UL (ref 3.9–12.7)

## 2021-09-23 PROCEDURE — 36415 COLL VENOUS BLD VENIPUNCTURE: CPT | Performed by: INTERNAL MEDICINE

## 2021-09-23 PROCEDURE — 80053 COMPREHEN METABOLIC PANEL: CPT | Performed by: INTERNAL MEDICINE

## 2021-09-23 PROCEDURE — 82378 CARCINOEMBRYONIC ANTIGEN: CPT | Performed by: INTERNAL MEDICINE

## 2021-09-23 PROCEDURE — 85025 COMPLETE CBC W/AUTO DIFF WBC: CPT | Performed by: INTERNAL MEDICINE

## 2021-09-23 PROCEDURE — 99214 OFFICE O/P EST MOD 30 MIN: CPT | Mod: S$GLB,,, | Performed by: INTERNAL MEDICINE

## 2021-09-23 PROCEDURE — 99214 PR OFFICE/OUTPT VISIT, EST, LEVL IV, 30-39 MIN: ICD-10-PCS | Mod: S$GLB,,, | Performed by: INTERNAL MEDICINE

## 2021-10-06 ENCOUNTER — HOSPITAL ENCOUNTER (OUTPATIENT)
Dept: RADIOLOGY | Facility: HOSPITAL | Age: 61
Discharge: HOME | End: 2021-10-06
Attending: INTERNAL MEDICINE
Payer: MEDICAID

## 2021-10-06 DIAGNOSIS — C21.0 ANAL CANCER: ICD-10-CM

## 2021-10-06 PROCEDURE — 25500020 PHARM REV CODE 255: Mod: PO | Performed by: INTERNAL MEDICINE

## 2021-10-06 PROCEDURE — 71260 CT THORAX DX C+: CPT | Mod: TC,PO

## 2021-10-06 PROCEDURE — 74177 CT ABD & PELVIS W/CONTRAST: CPT | Mod: TC,PO

## 2021-10-06 RX ADMIN — IOHEXOL 100 ML: 350 INJECTION, SOLUTION INTRAVENOUS at 12:10

## 2021-10-07 ENCOUNTER — TELEPHONE (OUTPATIENT)
Dept: HEMATOLOGY/ONCOLOGY | Facility: CLINIC | Age: 61
End: 2021-10-07

## 2022-09-20 ENCOUNTER — TELEPHONE (OUTPATIENT)
Dept: HEMATOLOGY/ONCOLOGY | Facility: CLINIC | Age: 62
End: 2022-09-20

## 2022-09-20 ENCOUNTER — LAB VISIT (OUTPATIENT)
Dept: LAB | Facility: HOSPITAL | Age: 62
End: 2022-09-20
Attending: INTERNAL MEDICINE
Payer: MEDICAID

## 2022-09-20 DIAGNOSIS — Z92.3 S/P RADIATION THERAPY: ICD-10-CM

## 2022-09-20 DIAGNOSIS — Z92.21 S/P CHEMOTHERAPY, TIME SINCE GREATER THAN 12 WEEKS: ICD-10-CM

## 2022-09-20 DIAGNOSIS — C21.0 ANAL CANCER: ICD-10-CM

## 2022-09-20 LAB
ALBUMIN SERPL BCP-MCNC: 3.9 G/DL (ref 3.5–5.2)
ALP SERPL-CCNC: 57 U/L (ref 55–135)
ALT SERPL W/O P-5'-P-CCNC: 40 U/L (ref 10–44)
ANION GAP SERPL CALC-SCNC: 8 MMOL/L (ref 8–16)
AST SERPL-CCNC: 31 U/L (ref 10–40)
BASOPHILS # BLD AUTO: 0.04 K/UL (ref 0–0.2)
BASOPHILS NFR BLD: 0.3 % (ref 0–1.9)
BILIRUB SERPL-MCNC: 0.5 MG/DL (ref 0.1–1)
BUN SERPL-MCNC: 21 MG/DL (ref 6–20)
CALCIUM SERPL-MCNC: 9.5 MG/DL (ref 8.7–10.5)
CEA SERPL-MCNC: 3.7 NG/ML (ref 0–5)
CHLORIDE SERPL-SCNC: 106 MMOL/L (ref 95–110)
CO2 SERPL-SCNC: 24 MMOL/L (ref 23–29)
CREAT SERPL-MCNC: 1.1 MG/DL (ref 0.5–1.4)
DIFFERENTIAL METHOD: ABNORMAL
EOSINOPHIL # BLD AUTO: 0.2 K/UL (ref 0–0.5)
EOSINOPHIL NFR BLD: 1.2 % (ref 0–8)
ERYTHROCYTE [DISTWIDTH] IN BLOOD BY AUTOMATED COUNT: 13.9 % (ref 11.5–14.5)
EST. GFR  (NO RACE VARIABLE): >60 ML/MIN/1.73 M^2
GLUCOSE SERPL-MCNC: 131 MG/DL (ref 70–110)
HCT VFR BLD AUTO: 42.3 % (ref 40–54)
HGB BLD-MCNC: 14 G/DL (ref 14–18)
IMM GRANULOCYTES # BLD AUTO: 0.07 K/UL (ref 0–0.04)
IMM GRANULOCYTES NFR BLD AUTO: 0.6 % (ref 0–0.5)
LYMPHOCYTES # BLD AUTO: 4.7 K/UL (ref 1–4.8)
LYMPHOCYTES NFR BLD: 38.9 % (ref 18–48)
MCH RBC QN AUTO: 31.1 PG (ref 27–31)
MCHC RBC AUTO-ENTMCNC: 33.1 G/DL (ref 32–36)
MCV RBC AUTO: 94 FL (ref 82–98)
MONOCYTES # BLD AUTO: 0.9 K/UL (ref 0.3–1)
MONOCYTES NFR BLD: 7.8 % (ref 4–15)
NEUTROPHILS # BLD AUTO: 6.2 K/UL (ref 1.8–7.7)
NEUTROPHILS NFR BLD: 51.2 % (ref 38–73)
NRBC BLD-RTO: 0 /100 WBC
PLATELET # BLD AUTO: 225 K/UL (ref 150–450)
PMV BLD AUTO: 8.9 FL (ref 9.2–12.9)
POTASSIUM SERPL-SCNC: 4.3 MMOL/L (ref 3.5–5.1)
PROT SERPL-MCNC: 7.3 G/DL (ref 6–8.4)
RBC # BLD AUTO: 4.5 M/UL (ref 4.6–6.2)
SODIUM SERPL-SCNC: 138 MMOL/L (ref 136–145)
WBC # BLD AUTO: 12.1 K/UL (ref 3.9–12.7)

## 2022-09-20 PROCEDURE — 80053 COMPREHEN METABOLIC PANEL: CPT | Performed by: INTERNAL MEDICINE

## 2022-09-20 PROCEDURE — 85025 COMPLETE CBC W/AUTO DIFF WBC: CPT | Performed by: INTERNAL MEDICINE

## 2022-09-20 PROCEDURE — 36415 COLL VENOUS BLD VENIPUNCTURE: CPT | Performed by: INTERNAL MEDICINE

## 2022-09-20 PROCEDURE — 82378 CARCINOEMBRYONIC ANTIGEN: CPT | Performed by: INTERNAL MEDICINE

## 2022-09-20 NOTE — TELEPHONE ENCOUNTER
Talked to patient about labs needed for his yearly follow up on 9/22/22. Pt verbalized understanding.

## 2022-10-25 NOTE — PROGRESS NOTES
[facility] Hematology/Oncology  PROGRESS NOTE -   Follow-up Visit      Subjective:       Patient ID:   NAME: Judah العلي III : 1960     62 y.o. male    Referring Doc: Raji Alvarez  Other Physicians: Dian Trujillo; Akbar Landry; Audi Capps (PCP)    Chief Complaint:  Anal SCCA f/u    History of Present Illness:     Patient returns today for a regularly scheduled follow-up visit.  The patient is here today to go over the results of the recently ordered labs, tests and studies. He is here by himself.     He is doing ok with no new issues. Breathing ok, bowels ok. He denies any CP, SOB, Ha's or N/V.       He last had f/u CT scans in Oct 2021. He is getting rescoped with Dr Webber in 2023       Discussed covid19 precautions - he had his vaccinations            ROS:   GEN: normal without any fever, night sweats or weight loss  HEENT: normal with no HA's, sore throat, stiff neck, changes in vision  CV: normal with no CP, SOB, PND, PIPER or orthopnea  PULM: normal with no SOB, cough, hemoptysis, sputum or pleuritic pain  GI: normal with no abdominal pain, nausea, vomiting, constipation, diarrhea, melanotic stools, BRBPR, or hematemesis  : normal with no hematuria, dysuria  BREAST: normal with no mass, discharge, pain  SKIN: normal with no rash, erythema, bruising, or swelling    Allergies:  Review of patient's allergies indicates:   Allergen Reactions    Sulfa dyne     Vicodin [hydrocodone-acetaminophen]        Medications:    Current Outpatient Medications:     atorvastatin (LIPITOR) 40 MG tablet, Take 40 mg by mouth once daily., Disp: , Rfl:     buPROPion (WELLBUTRIN SR) 150 MG TBSR 12 hr tablet, Take 150 mg by mouth 2 (two) times daily., Disp: , Rfl:     darunavir ethanolate (PREZISTA) 800 mg Tab, Take 800 mg by mouth., Disp: , Rfl:     ergocalciferol (ERGOCALCIFEROL) 50,000 unit Cap, TK 1 C PO Q WK FOR 56 DAYS, Disp: , Rfl: 2    pravastatin (PRAVACHOL) 40 MG tablet, Take 40 mg by mouth  once daily., Disp: , Rfl:     ritonavir (NORVIR) 100 mg Tab tablet, TK ONE T PO QD., Disp: , Rfl: 0    TRIUMEQ 600- mg Tab, Take 1 tablet by mouth once daily., Disp: , Rfl: 5    zolpidem (AMBIEN) 10 mg Tab, TK ONE T PO QHS PRF SLEEP, Disp: , Rfl: 2    clonazePAM (KLONOPIN) 0.5 MG tablet, Take 1 tablet (0.5 mg total) by mouth daily as needed for Anxiety. (Patient not taking: No sig reported), Disp: 30 tablet, Rfl: 0    PMHx/PSHx Updates:  See patient's last visit with me on 9/23/2021  See H&P on         Pathology:  Cancer Staging  No matching staging information was found for the patient.      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 rate 16, weight 82.6 kg (182 lb 3.2 oz).    Physical Examination:   GEN: no apparent distress, comfortable; AAOx3  HEAD: atraumatic and normocephalic  EYES: no pallor, no icterus, PERRLA  ENT: OMM, no pharyngeal erythema, external ears WNL; no nasal discharge; no thrush  NECK: no masses, thyroid normal, trachea midline, no LAD/LN's, supple  CV: RRR with no murmur; normal pulse; normal S1 and S2; no pedal edema  CHEST: Normal respiratory effort; CTAB; normal breath sounds; no wheeze or crackles  ABDOM: nontender and nondistended; soft; normal bowel sounds; no rebound/guarding  MUSC/Skeletal: ROM normal; no crepitus; joints normal; no deformities or arthropathy  EXTREM: no clubbing, cyanosis, inflammation or swelling  SKIN: no rashes, lesions, ulcers, petechiae or subcutaneous nodules  : no de la rosa  NEURO: grossly intact; motor/sensory WNL; AAOx3; no tremors  PSYCH: normal mood, affect and behavior  LYMPH: normal cervical, supraclavicular, axillary and groin LN's            Labs:      Lab Results   Component Value Date    WBC 12.10 09/20/2022    HGB 14.0 09/20/2022     HCT 42.3 09/20/2022    MCV 94 09/20/2022     09/20/2022     CMP  Sodium   Date Value Ref Range Status   09/20/2022 138 136 - 145 mmol/L Final     Potassium   Date Value Ref Range Status   09/20/2022 4.3 3.5 - 5.1 mmol/L Final     Chloride   Date Value Ref Range Status   09/20/2022 106 95 - 110 mmol/L Final     CO2   Date Value Ref Range Status   09/20/2022 24 23 - 29 mmol/L Final     Glucose   Date Value Ref Range Status   09/20/2022 131 (H) 70 - 110 mg/dL Final     BUN   Date Value Ref Range Status   09/20/2022 21 (H) 6 - 20 mg/dL Final     Creatinine   Date Value Ref Range Status   09/20/2022 1.1 0.5 - 1.4 mg/dL Final     Calcium   Date Value Ref Range Status   09/20/2022 9.5 8.7 - 10.5 mg/dL Final     Total Protein   Date Value Ref Range Status   09/20/2022 7.3 6.0 - 8.4 g/dL Final     Albumin   Date Value Ref Range Status   09/20/2022 3.9 3.5 - 5.2 g/dL Final     Total Bilirubin   Date Value Ref Range Status   09/20/2022 0.5 0.1 - 1.0 mg/dL Final     Comment:     For infants and newborns, interpretation of results should be based  on gestational age, weight and in agreement with clinical  observations.    Premature Infant recommended reference ranges:  Up to 24 hours.............<8.0 mg/dL  Up to 48 hours............<12.0 mg/dL  3-5 days..................<15.0 mg/dL  6-29 days.................<15.0 mg/dL       Alkaline Phosphatase   Date Value Ref Range Status   09/20/2022 57 55 - 135 U/L Final     AST   Date Value Ref Range Status   09/20/2022 31 10 - 40 U/L Final     ALT   Date Value Ref Range Status   09/20/2022 40 10 - 44 U/L Final     Anion Gap   Date Value Ref Range Status   09/20/2022 8 8 - 16 mmol/L Final     eGFR if    Date Value Ref Range Status   09/23/2021 >60.0 >60 mL/min/1.73 m^2 Final     eGFR if non    Date Value Ref Range Status   09/23/2021 >60.0 >60 mL/min/1.73 m^2 Final     Comment:     Calculation used to obtain the estimated glomerular filtration  rate  (eGFR) is the CKD-EPI equation.        Lab Results   Component Value Date    CEA 3.7 09/20/2022             Radiology/Diagnostic Studies:    CT Chest/Abdom/pelvis 10/6/2021     No evidence of local recurrence of neoplasm or metastatic disease.     Fusiform ectasia of the infrarenal segment of the abdominal aorta.     Additional observations as above.           Ct Chest Abdomen Pelvis With Contrast    Result Date: 9/19/2019      CMS MANDATED QUALITY DATA - CT RADIATION - 436 All CT scans at this facility utilize dose modulation, iterative reconstruction, and/or weight based dosing when appropriate to reduce radiation dose to as low as reasonably achievable. CLINICAL HISTORY: ([id number])57 y/o  (1960) M Neoplasm: colorectal, rx monitor or f/u; Neoplasm of unspecified behavior of digestive system TECHNIQUE: (A#[id number], exam time 9/19/2019 15:10) CT CHEST ABDOMEN PELVIS WITH CONTRAST (XPD) [id number] Axial CT images of the chest, abdomen and pelvis were obtained from the thoracic inlet to the proximal thigh. COMPARISON: CT most recently from 12/11/2014. FINDINGS: CT Chest: Visualized neck: normal Lungs: Mild apical paraseptal emphysematous lung disease. Airway: The trachea and central bronchial tree appear normal. Pleura: There is no pleural effusion. There is no pneumothorax. Cardiovascular: The heart is normal in size. There is no pericardial effusion. The thoracic aorta and great vessels are normal in course and caliber. Mediastinum: There is no supraclavicular  axillary  mediastinal  or hilar lymphadenopathy. Soft tissues: There is moderate prominence of the breast tissue  possibly representing gynecomastia. Musculoskeletal: There are mild degenerative changes of the thoracic spine.  There are no suspicious osseous lesions. Esophagus: normal CT Abdomen: Liver: Relative diffuse low-attenuation liver consistent with hepatic steatosis; no intrahepatic mass is identified. Gallbladder: There is a small pharyngeal  cap.  There are stones seen in the gallbladder without wall thickening or pericholecystic fluid to suggest acute cholecystitis. Biliary Tree: No intra or extrahepatic ductal dilation. Spleen: Normal. Pancreas: Within normal limits. Adrenal Glands: Normal Kidneys: Within normal limits. Vasculature: Scattered atheromatous calcifications are seen in the abdominal aorta and iliacs with mild ectasia of the infrarenal abdominal aorta measuring up to 2.5 cm in diameter. Lymph nodes: No abdominal lymphadenopathy is seen. Intraperitoneal structures: There is no ascites. Bowel: Moderate volume of stool and gas in a partially distended colon.  No gross intrahepatic masses identified noting several segments of under distended colon, consider correlation with the patient's history of recent colonoscopy.  There is no evidence of obstruction, intra-abdominal free air or abscess.  The appendix is within normal limits (image 246). Abdominal wall: Small bilateral fat containing inguinal hernias. Musculoskeletal: Mild degenerative changes are seen in the lower lumbar spine. CT Pelvis: Bladder: Normal. Reproductive Organs: The prostate and seminal vesicles are within normal limits. Pelvic Lymph nodes: No pelvic lymphadenopathy or pelvic mass is identified.     No acute abdominal or pelvic process with numerous additional incidental findings as noted above.       Electronically signed by: Audi Quintana MD Date:    09/19/2019 Time:    15:18      I have reviewed all available lab results and radiology reports.    Assessment/Plan:   (1) 62 y.o. male with diagnosis of SCCA of the anus who has been referred by Dr Raji Alvarez for continuation of care by medical oncology.   - diagnosed by rectal biopsy on 9/25/2013  - stage II  - T2 N0  M0  - s/p concurrent chemotherapy and XRT with 5FU and mitomycin completed in early 2014  - followed by Dr Webber with Gen Surg and Dr Gerard Trujillo with rad/onc   - s/p repeat scope with Dr Webber on  9/17/2019  - he had recent labs which are all good  - recent Ct 9/19/2019 which show no acute process      9/23/2021:  - schedule surveillance scan with CT's  - check up to date labs incl. CEA  - he is due for repeat scope with Dr Webber    10/26/2022:  -  He last had f/u CT scans in Oct 2021. He is getting rescoped with Dr Webber in Jan 2023  - CEA at 3.7 and stable  - CBC on chart and adequate       (2) HIV positive - followed by Dr Rosalie Landry with ID     (3) Former smoker        VISIT DIAGNOSES:      Anal cancer - Squamous CA - Sept 2013    S/P chemotherapy    S/P radiation therapy        PLAN:  1. F/u with PCP and Gen Surgery as directed by them  2. F/u with Dr Landry with ID  3. Check up to date labs incl. CEA every 6 months  4. F/u with Dr Webber for repeat scopes as directed by him  5. RTc in 12 months    Fax note to Frantz Capps [facility]     Discussion:     COVID-19 Discussion:    I had long discussion with patient and any applicable family about the COVID-19 coronavirus epidemic and the recommended precautions with regard to cancer and/or hematology patients. I have re-iterated the CDC recommendations for adequate hand washing, use of hand -like products, and coughing into elbow, etc. In addition, especially for our patients who are on chemotherapy and/or our otherwise immunocompromised patients, I have recommended avoidance of crowds, including movie theaters, restaurants, churches, etc. I have recommended avoidance of any sick or symptomatic family members and/or friends. I have also recommended avoidance of any raw and unwashed food products, and general avoidance of food items that have not been prepared by themselves. The patient has been asked to call us immediately with any symptom developments, issues, questions or other general concerns.     I spent over 25 mins of time with the patient. Reviewed results of the recently ordered labs, tests and studies; made directives with regards to  the results. Over half of this time was spent couseling and coordinating care.    I have explained all of the above in detail and the patient understands all of the current recommendation(s). I have answered all of their questions to the best of my ability and to their complete satisfaction.   The patient is to continue with the current management plan.            Electronically signed by Luis Cruz MD

## 2022-10-26 ENCOUNTER — OFFICE VISIT (OUTPATIENT)
Dept: HEMATOLOGY/ONCOLOGY | Facility: CLINIC | Age: 62
End: 2022-10-26
Payer: MEDICAID

## 2022-10-26 VITALS
RESPIRATION RATE: 16 BRPM | WEIGHT: 182.19 LBS | HEART RATE: 85 BPM | TEMPERATURE: 99 F | BODY MASS INDEX: 29.41 KG/M2 | DIASTOLIC BLOOD PRESSURE: 70 MMHG | SYSTOLIC BLOOD PRESSURE: 131 MMHG

## 2022-10-26 DIAGNOSIS — Z92.21 S/P CHEMOTHERAPY, TIME SINCE GREATER THAN 12 WEEKS: ICD-10-CM

## 2022-10-26 DIAGNOSIS — C21.0 ANAL CANCER: Primary | ICD-10-CM

## 2022-10-26 DIAGNOSIS — Z92.3 S/P RADIATION THERAPY: ICD-10-CM

## 2022-10-26 PROCEDURE — 99214 OFFICE O/P EST MOD 30 MIN: CPT | Mod: S$GLB,,, | Performed by: INTERNAL MEDICINE

## 2022-10-26 PROCEDURE — 1160F RVW MEDS BY RX/DR IN RCRD: CPT | Mod: CPTII,S$GLB,, | Performed by: INTERNAL MEDICINE

## 2022-10-26 PROCEDURE — 1160F PR REVIEW ALL MEDS BY PRESCRIBER/CLIN PHARMACIST DOCUMENTED: ICD-10-PCS | Mod: CPTII,S$GLB,, | Performed by: INTERNAL MEDICINE

## 2022-10-26 PROCEDURE — 3078F PR MOST RECENT DIASTOLIC BLOOD PRESSURE < 80 MM HG: ICD-10-PCS | Mod: CPTII,S$GLB,, | Performed by: INTERNAL MEDICINE

## 2022-10-26 PROCEDURE — 3078F DIAST BP <80 MM HG: CPT | Mod: CPTII,S$GLB,, | Performed by: INTERNAL MEDICINE

## 2022-10-26 PROCEDURE — 4010F PR ACE/ARB THEARPY RXD/TAKEN: ICD-10-PCS | Mod: CPTII,S$GLB,, | Performed by: INTERNAL MEDICINE

## 2022-10-26 PROCEDURE — 99214 PR OFFICE/OUTPT VISIT, EST, LEVL IV, 30-39 MIN: ICD-10-PCS | Mod: S$GLB,,, | Performed by: INTERNAL MEDICINE

## 2022-10-26 PROCEDURE — 3075F PR MOST RECENT SYSTOLIC BLOOD PRESS GE 130-139MM HG: ICD-10-PCS | Mod: CPTII,S$GLB,, | Performed by: INTERNAL MEDICINE

## 2022-10-26 PROCEDURE — 3075F SYST BP GE 130 - 139MM HG: CPT | Mod: CPTII,S$GLB,, | Performed by: INTERNAL MEDICINE

## 2022-10-26 PROCEDURE — 1159F PR MEDICATION LIST DOCUMENTED IN MEDICAL RECORD: ICD-10-PCS | Mod: CPTII,S$GLB,, | Performed by: INTERNAL MEDICINE

## 2022-10-26 PROCEDURE — 4010F ACE/ARB THERAPY RXD/TAKEN: CPT | Mod: CPTII,S$GLB,, | Performed by: INTERNAL MEDICINE

## 2022-10-26 PROCEDURE — 1159F MED LIST DOCD IN RCRD: CPT | Mod: CPTII,S$GLB,, | Performed by: INTERNAL MEDICINE

## 2023-10-24 NOTE — PROGRESS NOTES
[facility] Hematology/Oncology  PROGRESS NOTE -   Follow-up Visit      Subjective:       Patient ID:   NAME: Judah العلي III : 1960     63 y.o. male    Referring Doc: Raji Alvarez  Other Physicians: Dian Trujillo; Akbar Landry; Audi Capps (PCP)    Chief Complaint:  Anal SCCA f/u    History of Present Illness:     Patient returns today for a regularly scheduled follow-up visit.  The patient is here today to go over the results of the recently ordered labs, tests and studies. He is here by himself.     He is doing ok with no new issues. Breathing ok, bowels ok. He denies any CP, SOB, Ha's or N/V.     Cardiologist increased dose of his Jardiance       He last had f/u CT scans in Oct 2021. He is getting rescoped with Dr Webber in 2023       Discussed covid19 precautions - he had his vaccinations            ROS:   GEN: normal without any fever, night sweats or weight loss  HEENT: normal with no HA's, sore throat, stiff neck, changes in vision  CV: normal with no CP, SOB, PND, PIPER or orthopnea  PULM: normal with no SOB, cough, hemoptysis, sputum or pleuritic pain  GI: normal with no abdominal pain, nausea, vomiting, constipation, diarrhea, melanotic stools, BRBPR, or hematemesis  : normal with no hematuria, dysuria  BREAST: normal with no mass, discharge, pain  SKIN: normal with no rash, erythema, bruising, or swelling    Allergies:  Review of patient's allergies indicates:   Allergen Reactions    Sulfa dyne     Vicodin [hydrocodone-acetaminophen]        Medications:    Current Outpatient Medications:     atorvastatin (LIPITOR) 40 MG tablet, Take 40 mg by mouth once daily., Disp: , Rfl:     empagliflozin (JARDIANCE) 25 mg tablet, Take 25 mg by mouth once daily., Disp: , Rfl:     ergocalciferol (ERGOCALCIFEROL) 50,000 unit Cap, TK 1 C PO Q WK FOR 56 DAYS, Disp: , Rfl: 2    EScitalopram oxalate (LEXAPRO) 10 MG tablet, Take 10 mg by mouth once daily., Disp: , Rfl:     TRIUMEQ 600- mg  Tab, Take 1 tablet by mouth once daily., Disp: , Rfl: 5    buPROPion (WELLBUTRIN SR) 150 MG TBSR 12 hr tablet, Take 150 mg by mouth 2 (two) times daily., Disp: , Rfl:     clonazePAM (KLONOPIN) 0.5 MG tablet, Take 1 tablet (0.5 mg total) by mouth daily as needed for Anxiety. (Patient not taking: No sig reported), Disp: 30 tablet, Rfl: 0    darunavir ethanolate (PREZISTA) 800 mg Tab, Take 800 mg by mouth., Disp: , Rfl:     pravastatin (PRAVACHOL) 40 MG tablet, Take 40 mg by mouth once daily., Disp: , Rfl:     ritonavir (NORVIR) 100 mg Tab tablet, TK ONE T PO QD., Disp: , Rfl: 0    zolpidem (AMBIEN) 10 mg Tab, TK ONE T PO QHS PRF SLEEP, Disp: , Rfl: 2    PMHx/PSHx Updates:  See patient's last visit with me on 9/23/2021  See H&P on         Pathology:  Cancer Staging  No matching staging information was found for the patient.      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    Physical Examination:   GEN: no apparent distress, comfortable; AAOx3; overweight  HEAD: atraumatic and normocephalic  EYES: no pallor, no icterus, PERRLA  ENT: OMM, no pharyngeal erythema, external ears WNL; no nasal discharge; no thrush  NECK: no masses, thyroid normal, trachea midline, no LAD/LN's, supple  CV: RRR with no murmur; normal pulse; normal S1 and S2; no pedal edema  CHEST: Normal respiratory effort; CTAB; normal breath sounds; no wheeze or crackles  ABDOM: nontender and nondistended; soft; normal bowel sounds; no rebound/guarding  MUSC/Skeletal: ROM normal; no crepitus; joints normal; no deformities or arthropathy  EXTREM: no clubbing, cyanosis, inflammation or swelling  SKIN: no rashes, lesions, ulcers, petechiae or subcutaneous nodules  : no de la rosa  NEURO: grossly intact; motor/sensory WNL; AAOx3; no  tremors  PSYCH: normal mood, affect and behavior  LYMPH: normal cervical, supraclavicular, axillary and groin LN's            Labs:      Lab Results   Component Value Date    WBC 12.10 09/20/2022    HGB 14.0 09/20/2022    HCT 42.3 09/20/2022    MCV 94 09/20/2022     09/20/2022     CMP  Sodium   Date Value Ref Range Status   09/20/2022 138 136 - 145 mmol/L Final     Potassium   Date Value Ref Range Status   09/20/2022 4.3 3.5 - 5.1 mmol/L Final     Chloride   Date Value Ref Range Status   09/20/2022 106 95 - 110 mmol/L Final     CO2   Date Value Ref Range Status   09/20/2022 24 23 - 29 mmol/L Final     Glucose   Date Value Ref Range Status   09/20/2022 131 (H) 70 - 110 mg/dL Final     BUN   Date Value Ref Range Status   09/20/2022 21 (H) 6 - 20 mg/dL Final     Creatinine   Date Value Ref Range Status   09/20/2022 1.1 0.5 - 1.4 mg/dL Final     Calcium   Date Value Ref Range Status   09/20/2022 9.5 8.7 - 10.5 mg/dL Final     Total Protein   Date Value Ref Range Status   09/20/2022 7.3 6.0 - 8.4 g/dL Final     Albumin   Date Value Ref Range Status   09/20/2022 3.9 3.5 - 5.2 g/dL Final     Total Bilirubin   Date Value Ref Range Status   09/20/2022 0.5 0.1 - 1.0 mg/dL Final     Comment:     For infants and newborns, interpretation of results should be based  on gestational age, weight and in agreement with clinical  observations.    Premature Infant recommended reference ranges:  Up to 24 hours.............<8.0 mg/dL  Up to 48 hours............<12.0 mg/dL  3-5 days..................<15.0 mg/dL  6-29 days.................<15.0 mg/dL       Alkaline Phosphatase   Date Value Ref Range Status   09/20/2022 57 55 - 135 U/L Final     AST   Date Value Ref Range Status   09/20/2022 31 10 - 40 U/L Final     ALT   Date Value Ref Range Status   09/20/2022 40 10 - 44 U/L Final     Anion Gap   Date Value Ref Range Status   09/20/2022 8 8 - 16 mmol/L Final     eGFR if    Date Value Ref Range Status   09/23/2021  >60.0 >60 mL/min/1.73 m^2 Final     eGFR if non    Date Value Ref Range Status   09/23/2021 >60.0 >60 mL/min/1.73 m^2 Final     Comment:     Calculation used to obtain the estimated glomerular filtration  rate (eGFR) is the CKD-EPI equation.        Lab Results   Component Value Date    CEA 3.7 09/20/2022             Radiology/Diagnostic Studies:    CT Chest/Abdom/pelvis 10/6/2021     No evidence of local recurrence of neoplasm or metastatic disease.     Fusiform ectasia of the infrarenal segment of the abdominal aorta.     Additional observations as above.           Ct Chest Abdomen Pelvis With Contrast    Result Date: 9/19/2019      CMS MANDATED QUALITY DATA - CT RADIATION - 436 All CT scans at this facility utilize dose modulation, iterative reconstruction, and/or weight based dosing when appropriate to reduce radiation dose to as low as reasonably achievable. CLINICAL HISTORY: ([id number])59 y/o  (1960) M Neoplasm: colorectal, rx monitor or f/u; Neoplasm of unspecified behavior of digestive system TECHNIQUE: (A#[id number], exam time 9/19/2019 15:10) CT CHEST ABDOMEN PELVIS WITH CONTRAST (XPD) [id number] Axial CT images of the chest, abdomen and pelvis were obtained from the thoracic inlet to the proximal thigh. COMPARISON: CT most recently from 12/11/2014. FINDINGS: CT Chest: Visualized neck: normal Lungs: Mild apical paraseptal emphysematous lung disease. Airway: The trachea and central bronchial tree appear normal. Pleura: There is no pleural effusion. There is no pneumothorax. Cardiovascular: The heart is normal in size. There is no pericardial effusion. The thoracic aorta and great vessels are normal in course and caliber. Mediastinum: There is no supraclavicular  axillary  mediastinal  or hilar lymphadenopathy. Soft tissues: There is moderate prominence of the breast tissue  possibly representing gynecomastia. Musculoskeletal: There are mild degenerative changes of the thoracic spine.  There  are no suspicious osseous lesions. Esophagus: normal CT Abdomen: Liver: Relative diffuse low-attenuation liver consistent with hepatic steatosis; no intrahepatic mass is identified. Gallbladder: There is a small pharyngeal cap.  There are stones seen in the gallbladder without wall thickening or pericholecystic fluid to suggest acute cholecystitis. Biliary Tree: No intra or extrahepatic ductal dilation. Spleen: Normal. Pancreas: Within normal limits. Adrenal Glands: Normal Kidneys: Within normal limits. Vasculature: Scattered atheromatous calcifications are seen in the abdominal aorta and iliacs with mild ectasia of the infrarenal abdominal aorta measuring up to 2.5 cm in diameter. Lymph nodes: No abdominal lymphadenopathy is seen. Intraperitoneal structures: There is no ascites. Bowel: Moderate volume of stool and gas in a partially distended colon.  No gross intrahepatic masses identified noting several segments of under distended colon, consider correlation with the patient's history of recent colonoscopy.  There is no evidence of obstruction, intra-abdominal free air or abscess.  The appendix is within normal limits (image 246). Abdominal wall: Small bilateral fat containing inguinal hernias. Musculoskeletal: Mild degenerative changes are seen in the lower lumbar spine. CT Pelvis: Bladder: Normal. Reproductive Organs: The prostate and seminal vesicles are within normal limits. Pelvic Lymph nodes: No pelvic lymphadenopathy or pelvic mass is identified.     No acute abdominal or pelvic process with numerous additional incidental findings as noted above.       Electronically signed by: Audi Quintana MD Date:    09/19/2019 Time:    15:18      I have reviewed all available lab results and radiology reports.    Assessment/Plan:   (1) 63 y.o. male with diagnosis of SCCA of the anus who has been referred by Dr Raji Alvarez for continuation of care by medical oncology.   - diagnosed by rectal biopsy on 9/25/2013  -  stage II  - T2 N0  M0  - s/p concurrent chemotherapy and XRT with 5FU and mitomycin completed in early 2014  - followed by Dr Webber with Gen Surg and Dr Gerard Trujillo with rad/onc   - s/p repeat scope with Dr Webber on 9/17/2019  - he had recent labs which are all good  - recent Ct 9/19/2019 which show no acute process      9/23/2021:  - schedule surveillance scan with CT's  - check up to date labs incl. CEA  - he is due for repeat scope with Dr Webber    10/26/2022:  -  He last had f/u CT scans in Oct 2021. He is getting rescoped with Dr Webber in Jan 2023  - CEA at 3.7 and stable  - CBC on chart and adequate    10/25/2023:  - due for up dated labs incl. CEA  - bowels moving ok       (2) HIV positive - followed by Dr Rosalie Landry with ID    10/25/2023:  - viral load remains undetectable and t-cell count adequate according to patient     (3) Former smoker        VISIT DIAGNOSES:      1. Anal cancer - Squamous CA - Sept 2013  CBC Auto Differential    Comprehensive Metabolic Panel    CEA      2. S/P chemotherapy        3. S/P radiation therapy                  PLAN:  1. F/u with PCP and Gen Surgery as directed by them  2. F/u with Dr Landry with ID  3. Check up to date labs incl. CEA every 6 months  4. F/u with Dr Webber for repeat scopes as directed by him (encouraged)  5. RTC in 12 months    Fax note to Frant[facility] Capps [facility]     Discussion:     COVID-19 Discussion:    I had long discussion with patient and any applicable family about the COVID-19 coronavirus epidemic and the recommended precautions with regard to cancer and/or hematology patients. I have re-iterated the CDC recommendations for adequate hand washing, use of hand -like products, and coughing into elbow, etc. In addition, especially for our patients who are on chemotherapy and/or our otherwise immunocompromised patients, I have recommended avoidance of crowds, including movie theaters, restaurants, churches, etc. I have recommended  avoidance of any sick or symptomatic family members and/or friends. I have also recommended avoidance of any raw and unwashed food products, and general avoidance of food items that have not been prepared by themselves. The patient has been asked to call us immediately with any symptom developments, issues, questions or other general concerns.     I spent over 25 mins of time with the patient. Reviewed results of the recently ordered labs, tests and studies; made directives with regards to the results. Over half of this time was spent couseling and coordinating care.    I have explained all of the above in detail and the patient understands all of the current recommendation(s). I have answered all of their questions to the best of my ability and to their complete satisfaction.   The patient is to continue with the current management plan.            Electronically signed by Luis Cruz MD              Answers submitted by the patient for this visit:  Review of Systems Questionnaire (Submitted on 10/24/2023)  appetite change : No  unexpected weight change: No  mouth sores: No  visual disturbance: No  cough: No  shortness of breath: No  chest pain: No  abdominal pain: No  diarrhea: No  frequency: No  back pain: No  rash: No  headaches: No  adenopathy: No

## 2023-10-25 ENCOUNTER — LAB VISIT (OUTPATIENT)
Dept: LAB | Facility: HOSPITAL | Age: 63
End: 2023-10-25
Attending: INTERNAL MEDICINE
Payer: MEDICAID

## 2023-10-25 ENCOUNTER — OFFICE VISIT (OUTPATIENT)
Dept: HEMATOLOGY/ONCOLOGY | Facility: CLINIC | Age: 63
End: 2023-10-25
Payer: MEDICAID

## 2023-10-25 VITALS
BODY MASS INDEX: 29.21 KG/M2 | WEIGHT: 181 LBS | HEART RATE: 77 BPM | DIASTOLIC BLOOD PRESSURE: 72 MMHG | TEMPERATURE: 99 F | SYSTOLIC BLOOD PRESSURE: 117 MMHG

## 2023-10-25 DIAGNOSIS — Z92.3 S/P RADIATION THERAPY: ICD-10-CM

## 2023-10-25 DIAGNOSIS — C21.0 ANAL CANCER: ICD-10-CM

## 2023-10-25 DIAGNOSIS — Z92.21 S/P CHEMOTHERAPY, TIME SINCE GREATER THAN 12 WEEKS: ICD-10-CM

## 2023-10-25 DIAGNOSIS — C21.0 ANAL CANCER: Primary | ICD-10-CM

## 2023-10-25 LAB
ALBUMIN SERPL BCP-MCNC: 4.2 G/DL (ref 3.5–5.2)
ALP SERPL-CCNC: 49 U/L (ref 55–135)
ALT SERPL W/O P-5'-P-CCNC: 36 U/L (ref 10–44)
ANION GAP SERPL CALC-SCNC: 6 MMOL/L (ref 8–16)
AST SERPL-CCNC: 28 U/L (ref 10–40)
BASOPHILS # BLD AUTO: 0.04 K/UL (ref 0–0.2)
BASOPHILS NFR BLD: 0.5 % (ref 0–1.9)
BILIRUB SERPL-MCNC: 0.4 MG/DL (ref 0.1–1)
BUN SERPL-MCNC: 16 MG/DL (ref 8–23)
CALCIUM SERPL-MCNC: 8.8 MG/DL (ref 8.7–10.5)
CEA SERPL-MCNC: 3.5 NG/ML (ref 0–5)
CHLORIDE SERPL-SCNC: 109 MMOL/L (ref 95–110)
CO2 SERPL-SCNC: 26 MMOL/L (ref 23–29)
CREAT SERPL-MCNC: 0.9 MG/DL (ref 0.5–1.4)
DIFFERENTIAL METHOD: ABNORMAL
EOSINOPHIL # BLD AUTO: 0.2 K/UL (ref 0–0.5)
EOSINOPHIL NFR BLD: 3.3 % (ref 0–8)
ERYTHROCYTE [DISTWIDTH] IN BLOOD BY AUTOMATED COUNT: 14.3 % (ref 11.5–14.5)
EST. GFR  (NO RACE VARIABLE): >60 ML/MIN/1.73 M^2
GLUCOSE SERPL-MCNC: 100 MG/DL (ref 70–110)
HCT VFR BLD AUTO: 44 % (ref 40–54)
HGB BLD-MCNC: 14.7 G/DL (ref 14–18)
IMM GRANULOCYTES # BLD AUTO: 0.03 K/UL (ref 0–0.04)
IMM GRANULOCYTES NFR BLD AUTO: 0.4 % (ref 0–0.5)
LYMPHOCYTES # BLD AUTO: 3.1 K/UL (ref 1–4.8)
LYMPHOCYTES NFR BLD: 42.6 % (ref 18–48)
MCH RBC QN AUTO: 31.7 PG (ref 27–31)
MCHC RBC AUTO-ENTMCNC: 33.4 G/DL (ref 32–36)
MCV RBC AUTO: 95 FL (ref 82–98)
MONOCYTES # BLD AUTO: 0.7 K/UL (ref 0.3–1)
MONOCYTES NFR BLD: 8.8 % (ref 4–15)
NEUTROPHILS # BLD AUTO: 3.3 K/UL (ref 1.8–7.7)
NEUTROPHILS NFR BLD: 44.4 % (ref 38–73)
NRBC BLD-RTO: 0 /100 WBC
PLATELET # BLD AUTO: 193 K/UL (ref 150–450)
PMV BLD AUTO: 8.8 FL (ref 9.2–12.9)
POTASSIUM SERPL-SCNC: 4.2 MMOL/L (ref 3.5–5.1)
PROT SERPL-MCNC: 6.8 G/DL (ref 6–8.4)
RBC # BLD AUTO: 4.64 M/UL (ref 4.6–6.2)
SODIUM SERPL-SCNC: 141 MMOL/L (ref 136–145)
WBC # BLD AUTO: 7.35 K/UL (ref 3.9–12.7)

## 2023-10-25 PROCEDURE — 80053 COMPREHEN METABOLIC PANEL: CPT | Performed by: INTERNAL MEDICINE

## 2023-10-25 PROCEDURE — 3008F PR BODY MASS INDEX (BMI) DOCUMENTED: ICD-10-PCS | Mod: CPTII,S$GLB,, | Performed by: INTERNAL MEDICINE

## 2023-10-25 PROCEDURE — 1160F RVW MEDS BY RX/DR IN RCRD: CPT | Mod: CPTII,S$GLB,, | Performed by: INTERNAL MEDICINE

## 2023-10-25 PROCEDURE — 1159F MED LIST DOCD IN RCRD: CPT | Mod: CPTII,S$GLB,, | Performed by: INTERNAL MEDICINE

## 2023-10-25 PROCEDURE — 36415 COLL VENOUS BLD VENIPUNCTURE: CPT | Performed by: INTERNAL MEDICINE

## 2023-10-25 PROCEDURE — 3074F PR MOST RECENT SYSTOLIC BLOOD PRESSURE < 130 MM HG: ICD-10-PCS | Mod: CPTII,S$GLB,, | Performed by: INTERNAL MEDICINE

## 2023-10-25 PROCEDURE — 1159F PR MEDICATION LIST DOCUMENTED IN MEDICAL RECORD: ICD-10-PCS | Mod: CPTII,S$GLB,, | Performed by: INTERNAL MEDICINE

## 2023-10-25 PROCEDURE — 99214 PR OFFICE/OUTPT VISIT, EST, LEVL IV, 30-39 MIN: ICD-10-PCS | Mod: S$GLB,,, | Performed by: INTERNAL MEDICINE

## 2023-10-25 PROCEDURE — 1160F PR REVIEW ALL MEDS BY PRESCRIBER/CLIN PHARMACIST DOCUMENTED: ICD-10-PCS | Mod: CPTII,S$GLB,, | Performed by: INTERNAL MEDICINE

## 2023-10-25 PROCEDURE — 82378 CARCINOEMBRYONIC ANTIGEN: CPT | Performed by: INTERNAL MEDICINE

## 2023-10-25 PROCEDURE — 3078F DIAST BP <80 MM HG: CPT | Mod: CPTII,S$GLB,, | Performed by: INTERNAL MEDICINE

## 2023-10-25 PROCEDURE — 85025 COMPLETE CBC W/AUTO DIFF WBC: CPT | Performed by: INTERNAL MEDICINE

## 2023-10-25 PROCEDURE — 99214 OFFICE O/P EST MOD 30 MIN: CPT | Mod: S$GLB,,, | Performed by: INTERNAL MEDICINE

## 2023-10-25 PROCEDURE — 3008F BODY MASS INDEX DOCD: CPT | Mod: CPTII,S$GLB,, | Performed by: INTERNAL MEDICINE

## 2023-10-25 PROCEDURE — 3078F PR MOST RECENT DIASTOLIC BLOOD PRESSURE < 80 MM HG: ICD-10-PCS | Mod: CPTII,S$GLB,, | Performed by: INTERNAL MEDICINE

## 2023-10-25 PROCEDURE — 3074F SYST BP LT 130 MM HG: CPT | Mod: CPTII,S$GLB,, | Performed by: INTERNAL MEDICINE

## 2023-10-25 RX ORDER — ESCITALOPRAM OXALATE 10 MG/1
10 TABLET ORAL DAILY
COMMUNITY

## 2024-10-21 ENCOUNTER — TELEPHONE (OUTPATIENT)
Facility: CLINIC | Age: 64
End: 2024-10-21
Payer: MEDICAID

## 2024-10-21 NOTE — TELEPHONE ENCOUNTER
I spoke with pt and reminded him to have labs done prior to appt on 10/28/24 pt verbalized understanding.

## 2024-10-25 ENCOUNTER — TELEPHONE (OUTPATIENT)
Facility: CLINIC | Age: 64
End: 2024-10-25
Payer: MEDICAID

## 2024-11-02 ENCOUNTER — LAB VISIT (OUTPATIENT)
Dept: LAB | Facility: HOSPITAL | Age: 64
End: 2024-11-02
Attending: INTERNAL MEDICINE
Payer: MEDICAID

## 2024-11-02 DIAGNOSIS — C21.0 ANAL CANCER: ICD-10-CM

## 2024-11-02 LAB
ALBUMIN SERPL BCP-MCNC: 4.2 G/DL (ref 3.5–5.2)
ALP SERPL-CCNC: 51 U/L (ref 55–135)
ALT SERPL W/O P-5'-P-CCNC: 34 U/L (ref 10–44)
ANION GAP SERPL CALC-SCNC: 6 MMOL/L (ref 8–16)
AST SERPL-CCNC: 25 U/L (ref 10–40)
BASOPHILS # BLD AUTO: 0.05 K/UL (ref 0–0.2)
BASOPHILS NFR BLD: 0.5 % (ref 0–1.9)
BILIRUB SERPL-MCNC: 0.3 MG/DL (ref 0.1–1)
BUN SERPL-MCNC: 17 MG/DL (ref 8–23)
CALCIUM SERPL-MCNC: 8.9 MG/DL (ref 8.7–10.5)
CEA SERPL-MCNC: 2.9 NG/ML (ref 0–5)
CHLORIDE SERPL-SCNC: 107 MMOL/L (ref 95–110)
CO2 SERPL-SCNC: 30 MMOL/L (ref 23–29)
CREAT SERPL-MCNC: 1.1 MG/DL (ref 0.5–1.4)
DIFFERENTIAL METHOD BLD: ABNORMAL
EOSINOPHIL # BLD AUTO: 0.3 K/UL (ref 0–0.5)
EOSINOPHIL NFR BLD: 2.9 % (ref 0–8)
ERYTHROCYTE [DISTWIDTH] IN BLOOD BY AUTOMATED COUNT: 13.4 % (ref 11.5–14.5)
EST. GFR  (NO RACE VARIABLE): >60 ML/MIN/1.73 M^2
GLUCOSE SERPL-MCNC: 83 MG/DL (ref 70–110)
HCT VFR BLD AUTO: 46.4 % (ref 40–54)
HGB BLD-MCNC: 15 G/DL (ref 14–18)
IMM GRANULOCYTES # BLD AUTO: 0.03 K/UL (ref 0–0.04)
IMM GRANULOCYTES NFR BLD AUTO: 0.3 % (ref 0–0.5)
LYMPHOCYTES # BLD AUTO: 5.2 K/UL (ref 1–4.8)
LYMPHOCYTES NFR BLD: 53.2 % (ref 18–48)
MCH RBC QN AUTO: 31.6 PG (ref 27–31)
MCHC RBC AUTO-ENTMCNC: 32.3 G/DL (ref 32–36)
MCV RBC AUTO: 98 FL (ref 82–98)
MONOCYTES # BLD AUTO: 1 K/UL (ref 0.3–1)
MONOCYTES NFR BLD: 10 % (ref 4–15)
NEUTROPHILS # BLD AUTO: 3.3 K/UL (ref 1.8–7.7)
NEUTROPHILS NFR BLD: 33.1 % (ref 38–73)
NRBC BLD-RTO: 0 /100 WBC
PLATELET # BLD AUTO: 217 K/UL (ref 150–450)
PLATELET BLD QL SMEAR: ABNORMAL
PMV BLD AUTO: 9.2 FL (ref 9.2–12.9)
POTASSIUM SERPL-SCNC: 4.3 MMOL/L (ref 3.5–5.1)
PROT SERPL-MCNC: 7.1 G/DL (ref 6–8.4)
RBC # BLD AUTO: 4.75 M/UL (ref 4.6–6.2)
SODIUM SERPL-SCNC: 143 MMOL/L (ref 136–145)
WBC # BLD AUTO: 9.84 K/UL (ref 3.9–12.7)

## 2024-11-02 PROCEDURE — 36415 COLL VENOUS BLD VENIPUNCTURE: CPT | Performed by: INTERNAL MEDICINE

## 2024-11-02 PROCEDURE — 82378 CARCINOEMBRYONIC ANTIGEN: CPT | Performed by: INTERNAL MEDICINE

## 2024-11-02 PROCEDURE — 85025 COMPLETE CBC W/AUTO DIFF WBC: CPT | Performed by: INTERNAL MEDICINE

## 2024-11-02 PROCEDURE — 80053 COMPREHEN METABOLIC PANEL: CPT | Performed by: INTERNAL MEDICINE

## 2024-11-08 ENCOUNTER — OFFICE VISIT (OUTPATIENT)
Facility: CLINIC | Age: 64
End: 2024-11-08
Payer: MEDICAID

## 2024-11-08 VITALS
TEMPERATURE: 97 F | SYSTOLIC BLOOD PRESSURE: 124 MMHG | RESPIRATION RATE: 16 BRPM | BODY MASS INDEX: 30.02 KG/M2 | HEART RATE: 73 BPM | WEIGHT: 186 LBS | DIASTOLIC BLOOD PRESSURE: 69 MMHG

## 2024-11-08 DIAGNOSIS — Z72.0 TOBACCO USE: ICD-10-CM

## 2024-11-08 DIAGNOSIS — C21.0 ANAL CANCER: Primary | ICD-10-CM

## 2024-11-08 PROCEDURE — 99213 OFFICE O/P EST LOW 20 MIN: CPT | Mod: PBBFAC,PN | Performed by: NURSE PRACTITIONER

## 2024-11-08 PROCEDURE — 99999 PR PBB SHADOW E&M-EST. PATIENT-LVL III: CPT | Mod: PBBFAC,,, | Performed by: NURSE PRACTITIONER

## 2024-11-08 NOTE — PROGRESS NOTES
[facility] Hematology/Oncology  PROGRESS NOTE -   Follow-up Visit      Subjective:       Patient ID:   NAME: Judah العلي III : 1960     64 y.o. male    Referring Doc: Raji Alvarez  Other Physicians: Dian Trujillo; Akbar Landry; Audi Capps (PCP)    Chief Complaint:  Anal SCCA f/u    History of Present Illness:     Patient returns today for a regularly scheduled follow-up visit.  The patient is here today to go over the results of the recently ordered labs, tests and studies. He is here by himself.     He is doing ok with no new issues. Breathing ok, bowels ok. He denies any CP, SOB, Ha's or N/V.     Recent GI upset a few weeks ago.     He does see a PCP on [facility] Dr. Audi Capps.     He last had f/u CT scans in Oct 2021. He has not received a scope since Oct 2021.      Discussed covid19 precautions - he had his vaccinations      ROS:   GEN: normal without any fever, night sweats or weight loss  HEENT: normal with no HA's, sore throat, stiff neck, changes in vision  CV: normal with no CP, SOB, PND, PIPER or orthopnea  PULM: normal with no SOB, cough, hemoptysis, sputum or pleuritic pain  GI: normal with no abdominal pain, nausea, vomiting, constipation, diarrhea, melanotic stools, BRBPR, or hematemesis  : normal with no hematuria, dysuria  BREAST: normal with no mass, discharge, pain  SKIN: normal with no rash, erythema, bruising, or swelling    Allergies:  Review of patient's allergies indicates:   Allergen Reactions    Sulfa dyne     Vicodin [hydrocodone-acetaminophen]        Medications:    Current Outpatient Medications:     atorvastatin (LIPITOR) 40 MG tablet, Take 40 mg by mouth once daily., Disp: , Rfl:     empagliflozin (JARDIANCE) 25 mg tablet, Take 25 mg by mouth once daily., Disp: , Rfl:     ergocalciferol (ERGOCALCIFEROL) 50,000 unit Cap, TK 1 C PO Q WK FOR 56 DAYS, Disp: , Rfl: 2    EScitalopram oxalate (LEXAPRO) 10 MG tablet, Take 10 mg by mouth once daily., Disp: ,  Rfl:     TRIUMEQ 600- mg Tab, Take 1 tablet by mouth once daily., Disp: , Rfl: 5    buPROPion (WELLBUTRIN SR) 150 MG TBSR 12 hr tablet, Take 150 mg by mouth 2 (two) times daily., Disp: , Rfl:     clonazePAM (KLONOPIN) 0.5 MG tablet, Take 1 tablet (0.5 mg total) by mouth daily as needed for Anxiety. (Patient not taking: No sig reported), Disp: 30 tablet, Rfl: 0    darunavir ethanolate (PREZISTA) 800 mg Tab, Take 800 mg by mouth., Disp: , Rfl:     pravastatin (PRAVACHOL) 40 MG tablet, Take 40 mg by mouth once daily., Disp: , Rfl:     ritonavir (NORVIR) 100 mg Tab tablet, TK ONE T PO QD., Disp: , Rfl: 0    zolpidem (AMBIEN) 10 mg Tab, TK ONE T PO QHS PRF SLEEP, Disp: , Rfl: 2    PMHx/PSHx Updates:  See patient's last visit with Dr. Cruz on 10/23/2023  See H&P on         Pathology:  Cancer Staging  No matching staging information was found for the patient.      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 rate 16, weight 84.4 kg (186 lb).    Physical Examination:   GEN: no apparent distress, comfortable; AAOx3; overweight  HEAD: atraumatic and normocephalic  EYES: no pallor, no icterus, PERRLA  ENT: OMM, no pharyngeal erythema, external ears WNL; no nasal discharge; no thrush  NECK: no masses, thyroid normal, trachea midline, no LAD/LN's, supple  CV: RRR with no murmur; normal pulse; normal S1 and S2; no pedal edema  CHEST: Normal respiratory effort; CTAB; normal breath sounds; no wheeze or crackles  ABDOM: nontender and nondistended; soft; normal bowel sounds; no rebound/guarding  MUSC/Skeletal: ROM normal; no crepitus; joints normal; no deformities or arthropathy  EXTREM: no clubbing, cyanosis, inflammation or swelling  SKIN: no rashes, lesions, ulcers, petechiae or subcutaneous nodules  : no  de la rosa  NEURO: grossly intact; motor/sensory WNL; AAOx3; no tremors  PSYCH: normal mood, affect and behavior  LYMPH: normal cervical, supraclavicular, axillary and groin LN's            Labs:      Lab Results   Component Value Date    WBC 9.84 11/02/2024    HGB 15.0 11/02/2024    HCT 46.4 11/02/2024    MCV 98 11/02/2024     11/02/2024     CMP  Sodium   Date Value Ref Range Status   11/02/2024 143 136 - 145 mmol/L Final     Potassium   Date Value Ref Range Status   11/02/2024 4.3 3.5 - 5.1 mmol/L Final     Chloride   Date Value Ref Range Status   11/02/2024 107 95 - 110 mmol/L Final     CO2   Date Value Ref Range Status   11/02/2024 30 (H) 23 - 29 mmol/L Final     Glucose   Date Value Ref Range Status   11/02/2024 83 70 - 110 mg/dL Final     BUN   Date Value Ref Range Status   11/02/2024 17 8 - 23 mg/dL Final     Creatinine   Date Value Ref Range Status   11/02/2024 1.1 0.5 - 1.4 mg/dL Final     Calcium   Date Value Ref Range Status   11/02/2024 8.9 8.7 - 10.5 mg/dL Final     Total Protein   Date Value Ref Range Status   11/02/2024 7.1 6.0 - 8.4 g/dL Final     Albumin   Date Value Ref Range Status   11/02/2024 4.2 3.5 - 5.2 g/dL Final     Total Bilirubin   Date Value Ref Range Status   11/02/2024 0.3 0.1 - 1.0 mg/dL Final     Comment:     For infants and newborns, interpretation of results should be based  on gestational age, weight and in agreement with clinical  observations.    Premature Infant recommended reference ranges:  Up to 24 hours.............<8.0 mg/dL  Up to 48 hours............<12.0 mg/dL  3-5 days..................<15.0 mg/dL  6-29 days.................<15.0 mg/dL       Alkaline Phosphatase   Date Value Ref Range Status   11/02/2024 51 (L) 55 - 135 U/L Final     AST   Date Value Ref Range Status   11/02/2024 25 10 - 40 U/L Final     ALT   Date Value Ref Range Status   11/02/2024 34 10 - 44 U/L Final     Anion Gap   Date Value Ref Range Status   11/02/2024 6 (L) 8 - 16 mmol/L Final     eGFR if     Date Value Ref Range Status   09/23/2021 >60.0 >60 mL/min/1.73 m^2 Final     eGFR if non    Date Value Ref Range Status   09/23/2021 >60.0 >60 mL/min/1.73 m^2 Final     Comment:     Calculation used to obtain the estimated glomerular filtration  rate (eGFR) is the CKD-EPI equation.        Lab Results   Component Value Date    CEA 2.9 11/02/2024             Radiology/Diagnostic Studies:    CT Chest/Abdom/pelvis 10/6/2021     No evidence of local recurrence of neoplasm or metastatic disease.     Fusiform ectasia of the infrarenal segment of the abdominal aorta.     Additional observations as above.           Ct Chest Abdomen Pelvis With Contrast    Result Date: 9/19/2019      CMS MANDATED QUALITY DATA - CT RADIATION - 436 All CT scans at this facility utilize dose modulation, iterative reconstruction, and/or weight based dosing when appropriate to reduce radiation dose to as low as reasonably achievable. CLINICAL HISTORY: ([id number])57 y/o  (1960) M Neoplasm: colorectal, rx monitor or f/u; Neoplasm of unspecified behavior of digestive system TECHNIQUE: (A#[id number], exam time 9/19/2019 15:10) CT CHEST ABDOMEN PELVIS WITH CONTRAST (XPD) [id number] Axial CT images of the chest, abdomen and pelvis were obtained from the thoracic inlet to the proximal thigh. COMPARISON: CT most recently from 12/11/2014. FINDINGS: CT Chest: Visualized neck: normal Lungs: Mild apical paraseptal emphysematous lung disease. Airway: The trachea and central bronchial tree appear normal. Pleura: There is no pleural effusion. There is no pneumothorax. Cardiovascular: The heart is normal in size. There is no pericardial effusion. The thoracic aorta and great vessels are normal in course and caliber. Mediastinum: There is no supraclavicular  axillary  mediastinal  or hilar lymphadenopathy. Soft tissues: There is moderate prominence of the breast tissue  possibly representing gynecomastia. Musculoskeletal: There  are mild degenerative changes of the thoracic spine.  There are no suspicious osseous lesions. Esophagus: normal CT Abdomen: Liver: Relative diffuse low-attenuation liver consistent with hepatic steatosis; no intrahepatic mass is identified. Gallbladder: There is a small pharyngeal cap.  There are stones seen in the gallbladder without wall thickening or pericholecystic fluid to suggest acute cholecystitis. Biliary Tree: No intra or extrahepatic ductal dilation. Spleen: Normal. Pancreas: Within normal limits. Adrenal Glands: Normal Kidneys: Within normal limits. Vasculature: Scattered atheromatous calcifications are seen in the abdominal aorta and iliacs with mild ectasia of the infrarenal abdominal aorta measuring up to 2.5 cm in diameter. Lymph nodes: No abdominal lymphadenopathy is seen. Intraperitoneal structures: There is no ascites. Bowel: Moderate volume of stool and gas in a partially distended colon.  No gross intrahepatic masses identified noting several segments of under distended colon, consider correlation with the patient's history of recent colonoscopy.  There is no evidence of obstruction, intra-abdominal free air or abscess.  The appendix is within normal limits (image 246). Abdominal wall: Small bilateral fat containing inguinal hernias. Musculoskeletal: Mild degenerative changes are seen in the lower lumbar spine. CT Pelvis: Bladder: Normal. Reproductive Organs: The prostate and seminal vesicles are within normal limits. Pelvic Lymph nodes: No pelvic lymphadenopathy or pelvic mass is identified.     No acute abdominal or pelvic process with numerous additional incidental findings as noted above.       Electronically signed by: Audi Quintana MD Date:    09/19/2019 Time:    15:18      I have reviewed all available lab results and radiology reports.    Assessment/Plan:   (1) 64 y.o. male with diagnosis of SCCA of the anus who has been referred by Dr Raji Alvarez for continuation of care by  medical oncology.   - diagnosed by rectal biopsy on 9/25/2013  - stage II  - T2 N0  M0  - s/p concurrent chemotherapy and XRT with 5FU and mitomycin completed in early 2014  - followed by Dr Webber with Gen Surg and Dr Gerard Trujillo with rad/onc   - s/p repeat scope with Dr Webber on 9/17/2019  - he had recent labs which are all good  - recent Ct 9/19/2019 which show no acute process      9/23/2021:  - schedule surveillance scan with CT's  - check up to date labs incl. CEA  - he is due for repeat scope with Dr Webber    10/26/2022:  -  He last had f/u CT scans in Oct 2021. He is getting rescoped with Dr Webber in Jan 2023  - CEA at 3.7 and stable  - CBC on chart and adequate    10/25/2023:  - due for up dated labs incl. CEA  - bowels moving ok    11/8/2024:   - labs are stable   - patient still smoking    - CEA low   - he did see Dr. Moscoso this year, but we need those records   - continue to see Cardiology for aneurysm         (2) HIV positive - followed by Dr Rosalie Landry with ID    10/25/2023:  - viral load remains undetectable and t-cell count adequate according to patient     (3) Former smoker - continues to smoke cigarrettes     11/8/2024:   - needs a CT of his chest, he states he has had one recently but we do not have the records         VISIT DIAGNOSES:      1. Anal cancer  CANCELED: Ambulatory referral/consult to Gastroenterology      2. Tobacco use          PLAN:  1. F/u with PCP and Gen Surgery as directed by them  2. F/u with Dr Landry with ID  3. Check up to date labs incl. CEA every 6 months  4. F/u with Dr. Moscoso for scopes and needs   5. CT chest screening     RTC 12 months with labs with Dr. Cruz      Fax note to Frantz Capps [place]     Discussion:     COVID-19 Discussion:    I had long discussion with patient and any applicable family about the COVID-19 coronavirus epidemic and the recommended precautions with regard to cancer and/or hematology patients. I have re-iterated the CDC  recommendations for adequate hand washing, use of hand -like products, and coughing into elbow, etc. In addition, especially for our patients who are on chemotherapy and/or our otherwise immunocompromised patients, I have recommended avoidance of crowds, including movie theaters, restaurants, churches, etc. I have recommended avoidance of any sick or symptomatic family members and/or friends. I have also recommended avoidance of any raw and unwashed food products, and general avoidance of food items that have not been prepared by themselves. The patient has been asked to call us immediately with any symptom developments, issues, questions or other general concerns.     I have explained all of the above in detail and the patient understands all of the current recommendation(s). I have answered all of their questions to the best of my ability and to their complete satisfaction.   The patient is to continue with the current management plan.            Electronically signed by Galina Rush NP